# Patient Record
Sex: MALE | Race: WHITE | URBAN - METROPOLITAN AREA
[De-identification: names, ages, dates, MRNs, and addresses within clinical notes are randomized per-mention and may not be internally consistent; named-entity substitution may affect disease eponyms.]

---

## 2017-01-12 ENCOUNTER — OFFICE VISIT (OUTPATIENT)
Dept: FAMILY MEDICINE | Facility: CLINIC | Age: 47
End: 2017-01-12

## 2017-01-12 ENCOUNTER — OFFICE VISIT (OUTPATIENT)
Dept: PHARMACY | Facility: PHYSICIAN GROUP | Age: 47
End: 2017-01-12

## 2017-01-12 VITALS
BODY MASS INDEX: 35.22 KG/M2 | RESPIRATION RATE: 16 BRPM | HEART RATE: 84 BPM | HEIGHT: 70 IN | DIASTOLIC BLOOD PRESSURE: 72 MMHG | WEIGHT: 246 LBS | SYSTOLIC BLOOD PRESSURE: 112 MMHG | OXYGEN SATURATION: 98 % | TEMPERATURE: 98.2 F

## 2017-01-12 DIAGNOSIS — E66.09 OBESITY DUE TO EXCESS CALORIES, UNSPECIFIED OBESITY SEVERITY: ICD-10-CM

## 2017-01-12 DIAGNOSIS — J30.1 ALLERGIC RHINITIS DUE TO POLLEN, UNSPECIFIED RHINITIS SEASONALITY: ICD-10-CM

## 2017-01-12 DIAGNOSIS — J30.1 ALLERGIC RHINITIS DUE TO POLLEN: ICD-10-CM

## 2017-01-12 DIAGNOSIS — E78.5 HYPERLIPIDEMIA LDL GOAL <100: ICD-10-CM

## 2017-01-12 DIAGNOSIS — K21.9 GASTROESOPHAGEAL REFLUX DISEASE WITHOUT ESOPHAGITIS: ICD-10-CM

## 2017-01-12 DIAGNOSIS — M67.949 DISORDER OF TENDON IN FINGER: ICD-10-CM

## 2017-01-12 DIAGNOSIS — I10 BENIGN ESSENTIAL HYPERTENSION: ICD-10-CM

## 2017-01-12 DIAGNOSIS — J06.9 VIRAL UPPER RESPIRATORY TRACT INFECTION: ICD-10-CM

## 2017-01-12 LAB — HBA1C MFR BLD: 7.5 % (ref 4–7)

## 2017-01-12 PROCEDURE — 83036 HEMOGLOBIN GLYCOSYLATED A1C: CPT | Performed by: FAMILY MEDICINE

## 2017-01-12 PROCEDURE — 84443 ASSAY THYROID STIM HORMONE: CPT | Performed by: FAMILY MEDICINE

## 2017-01-12 PROCEDURE — 99605 MTMS BY PHARM NP 15 MIN: CPT | Performed by: PHARMACIST

## 2017-01-12 PROCEDURE — 99214 OFFICE O/P EST MOD 30 MIN: CPT | Performed by: FAMILY MEDICINE

## 2017-01-12 PROCEDURE — 99607 MTMS BY PHARM ADDL 15 MIN: CPT | Performed by: PHARMACIST

## 2017-01-12 PROCEDURE — 36415 COLL VENOUS BLD VENIPUNCTURE: CPT | Performed by: FAMILY MEDICINE

## 2017-01-12 RX ORDER — GUAIFENESIN 600 MG/1
1200 TABLET, EXTENDED RELEASE ORAL 2 TIMES DAILY PRN
Qty: 40 TABLET | Refills: 0 | Status: SHIPPED | OUTPATIENT
Start: 2017-01-12 | End: 2017-01-12

## 2017-01-12 RX ORDER — GUAIFENESIN 600 MG/1
1200 TABLET, EXTENDED RELEASE ORAL 2 TIMES DAILY PRN
Qty: 40 TABLET | Refills: 0 | Status: SHIPPED | OUTPATIENT
Start: 2017-01-12 | End: 2017-04-13

## 2017-01-12 NOTE — PROGRESS NOTES
SUBJECTIVE:  Reg Kathleen is an 46 year old male who presents for evaluation and treatment   of Type 2 diabetes mellitus with kidney issues/seeing Ree today. Age at diagnosis 33. Family history   positive for diabetes in the patient s mother, father, brother and sister.   Previous treatment modalities employed include diet, oral agents, exercise, intensive insulin injection program and ASA.   Current treatment includes oral agents, exercise, insulin injections and ASA.     Current monitoring regimen: home blood tests - once daily  Home blood sugar records: on a sliding scale for dinner dose  Last HgbA1c: 7.5/ not improved  Diabetic complications: nephropathy  Cardiovascular risk factors: previous smoker, family history, lipids, diabetes mellitus, hypertension, obesity and stress    Current Outpatient Prescriptions   Medication     canagliflozin (INVOKANA) 300 MG tablet     insulin aspart (NOVOLOG FLEXPEN) 100 UNIT/ML soln     insulin glargine (LANTUS SOLOSTAR) 100 UNIT/ML PEN     omeprazole (PRILOSEC) 20 MG capsule     liraglutide (VICTOZA PEN) 18 MG/3ML soln     metFORMIN (GLUCOPHAGE) 1000 MG tablet     losartan (COZAAR) 50 MG tablet     azelastine (ASTELIN) 0.1 % nasal spray     ibuprofen (ADVIL,MOTRIN) 600 MG tablet     blood glucose monitoring (ONE TOUCH TEST STRIPS) test strip     insulin pen needle (B-D U/F) 31G X 8 MM     ASPIRIN NOT PRESCRIBED, INTENTIONAL,     atorvastatin (LIPITOR) 10 MG tablet     No current facility-administered medications for this visit.     Allergies   Allergen Reactions     Amoxicillin Hives     Penicillins Hives     Septra [Sulfamethoxazole W-Trimethoprim] Hives       Social History   Substance Use Topics     Smoking status: Former Smoker -- 0.10 packs/day for 10 years     Types: Cigarettes     Quit date: 01/01/1998     Smokeless tobacco: Never Used      Comment: 3 cigarettes per day, smoked socially     Alcohol Use: 3.6 oz/week     6 Standard drinks or equivalent per  "week      Comment: 4 days per week, 3 drinks, more on weekends       Review Of Systems  Skin: rash  Eyes: saw eye MD, 2015/ needs visit  Ears/Nose/Throat: cold symptoms for a month with some sinus pressure/ worse with recent flying. Wonders about a sinus infection  Respiratory: No shortness of breath, dyspnea on exertion, cough, or hemoptysis  Cardiovascular: BP and cholesterol  Gastrointestinal: heartburn  Genitourinary: negative  Musculoskeletal: playing volleyball in [place] in November. Jammed his left fourth finger. Negative xray's. John taped but now stuck in flexion positions  Neurologic: negative  Psychiatric: excessive stress-traveling to [place] with work  Hematologic/Lymphatic/Immunologic: negative  Endocrine: diabetes    OBJECTIVE:  /72 mmHg  Pulse 84  Temp(Src) 98.2  F (36.8  C) (Oral)  Resp 16  Ht 1.772 m (5' 9.75\")  Wt 111.585 kg (246 lb)  BMI 35.54 kg/m2  SpO2 98%  General appearance: healthy, alert, no distress, cooperative, smiling and over weight  Skin: Skin color, texture, turgor normal. No rashes or lesions.  Eyes: conjunctivae/corneas clear. PERRL, EOM's intact. Fundi benign  Ears: negative  Nose: Clear rhinitis  Oropharynx: Lips, mucosa, and tongue normal. Teeth and gums normal. Post nasal drianage clear  Neck: Neck supple. No adenopathy. Thyroid symmetric, normal size,, Carotids without bruits.  Lungs: negative, Percussion normal. Good diaphragmatic excursion. Lungs clear  Heart: negative, PMI normal. No lifts, heaves, or thrills. RRR. No murmurs, clicks gallops or rub  Abdomen: Abdomen soft, non-tender. BS normal. No masses, organomegaly  Extremities: negative findings: no erythema, induration, or nodules, strength normal, positive findings: left fourth finger contraction in flexion/ Boutinerer deformity  Monofilament WNL  Peripheral pulses: radial=4/4, femoral=4/4, popliteal=4/4, dorsalis pedis=4/4,  Neuro: Gait normal. Reflexes normal and symmetric. Sensation grossly WNL.  BMI : " Body mass index is 35.54 kg/(m^2).    ASSESSMENT:  (E08.21,  E08.65,  Z79.4) Diabetes mellitus due to underlying condition, uncontrolled, with diabetic nephropathy, with long-term current use of insulin (H)  (primary encounter diagnosis)  Comment: poor compliance with diet and exercise  Plan: Hemoglobin A1c (BFP), TSH ([facility]), Foot Exam -         HIM Scan        Back to [facility]  Reviewed concepts of diabetes self-management stressing the primary   role of the patient in monitoring and maintaining control of   diabetes.    (J06.9,  B97.89) Viral upper respiratory tract infection  Plan: guaiFENesin (MUCINEX) 600 MG 12 hr tablet,         DISCONTINUED: guaiFENesin (MUCINEX) 600 MG 12         hr tablet        Symptomatic care with decongestants, fluids, tylenol/advil prn. Use GUAIFENESIN  MG OR TBCR, 1 tab po BID (Twice per day), D: 20, R: 0 for congestion and cough.    In addition, I have suggested that the patient   monitor for symptoms of bacterial infection expecting slow gradual resolution of viral URI as the natural course.      (M64.014) Disorder of tendon in finger  Comment: reviewed anatomy  Plan: ORTHOPEDICS ADULT REFERRAL        Consult with Dr Lee    (E66.09) Obesity due to excess calories, unspecified obesity severity  Plan: A low fat diet, regular aerobic exercise like walking 30 minutes daily and weight loss is the treatment recommendations at this time

## 2017-01-12 NOTE — PATIENT INSTRUCTIONS
Recommendations from today's MTM visit:                                                      1. Novolog 10 units with dinner    2. Keep checking sugars at 9 (or 2 hours after dinner)    Next MTM visit: I will call you next Friday at 11:30am    To schedule another MTM appointment, please call the clinic directly or you may call the MTM scheduling line at [phone] or toll-free at [phone].     My Clinical Pharmacist's contact information:                                                      It was a pleasure seeing you today!  Please feel free to contact me with any questions or concerns you have.      Ree Newell, Pharm.D, [facility]CP  Medication Therapy Management Pharmacist  [phone]    You may receive a survey about the MTM services you received.  I would appreciate your feedback to help me serve you better in the future. Please fill it out and return it when you can. Your comments will be anonymous.      My healthcare goals:                                                      Diabetes Goals:    Home Monitoring of Blood Sugars:Fasting  mg/dL and 2 hours after a meal less than 150 mg/dL.    Hemoglobin A1C: Less than 7%. Yours is A1C      7.5   1/12/2017.    Blood Pressure: Less than 140/90mmHg. Yours is There were no vitals taken for this visit..    Cholesterol: You are taking atorvastatin to help decrease the risk of heart disease.    Things you can do to help lower your blood sugars:    Diet: 3 meals and 1-2 snacks per day with 45-60 grams of carbohydrates per meal and 15-30 grams of carbohydrates per snack. Try to fill your plate at least half-full with vegetables, fill one-quarter full with lean meats or protein, and also make sure you get at least some carbohydrate with every meal.    Exercise: 30 minutes per day of anything that will increase your heart rate and make you break a sweat! Gardening, walking, cleaning the house, changing the oil in your car, etc. If you feel like 30 minutes  per day is too much, start small. Even lifting canned foods or working your arms with a resistance band in front of the TV can help.

## 2017-01-12 NOTE — NURSING NOTE
Patient is here for a recheck of their medication.  Also has a n injury to his left hand 4th finger - hurt it in Nov  Pre-Visit Screening :  Immunizations : Not applicable    Colonoscopy :na  Mammogram : na  Asthma Action Test/Plan : na  PHQ9/GAD7 :  na    BP done on the left arm, with a lg sized cuff.  Pulse - regular  My Chart - accepts    CLASSIFICATION OF OVERWEIGHT AND OBESITY BY BMI                         Obesity Class           BMI(kg/m2)  Underweight                                    < 18.5  Normal                                         18.5-24.9  Overweight                                     25.0-29.9  OBESITY                     I                  30.0-34.9                              II                 35.0-39.9  EXTREME OBESITY             III                >40                             Patient's  BMI Body mass index is 35.54 kg/(m^2).  http://hin.nhlbi.nih.gov/menuplanner/menu.cgi  Questioned patient about current smoking habits.  Pt. has never smoked.

## 2017-01-12 NOTE — MR AVS SNAPSHOT
After Visit Summary   1/12/2017    Reg Kathleen    MRN: [id number]           Patient Information     Date Of Birth          1970        Visit Information        Provider Department      1/12/2017 12:00 PM Lashae Tan MD [facility]        Today's Diagnoses     Diabetes mellitus due to underlying condition, uncontrolled, with diabetic nephropathy, with long-term current use of insulin (H)    -  1     Viral upper respiratory tract infection         Disorder of tendon in finger         Obesity due to excess calories, unspecified obesity severity           Care Instructions      [email or web address]     A low fat diet, regular aerobic exercise like walking 30 minutes daily and weight loss  is the treatment recommendations at this time    Back to ree    Schedule a consult with DR Lee.            Follow-ups after your visit        Additional Services     ORTHOPEDICS ADULT REFERRAL       Your provider has referred you to: [facility] [place] [phone]   [email or web address]    Please be aware that coverage of these services is subject to the terms and limitations of your health insurance plan.  Call member services at your health plan with any benefit or coverage questions.      Please bring the following to your appointment:    >>   Any x-rays, CTs or MRIs which have been performed.  Contact the facility where they were done to arrange for  prior to your scheduled appointment.    >>   List of current medications   >>   This referral request   >>   Any documents/labs given to you for this referral                  Your next 10 appointments already scheduled     Jan 12, 2017  1:00 PM   SHORT with Ree Newell RPH   [facility] M[facility] ([facility])    [address]  [address]  [facility] [id number] [phone]            Jan  "19, 2017  8:30 AM   HST  with BED 7 SH SLEEP   [facility] ([facility])    [address]  [address]  [place] [id number] [phone]            Jan 20, 2017  8:30 AM   HST Drop Off with  SLEEP CENTER DME   [facility]    [address]  [place] [id number] [phone]              Who to contact     If you have questions or need follow up information about today's clinic visit or your schedule please contact [facility] directly at [phone].  Normal or non-critical lab and imaging results will be communicated to you by Stepcasehart, letter or phone within 4 business days after the clinic has received the results. If you do not hear from us within 7 days, please contact the clinic through [employer] or phone. If you have a critical or abnormal lab result, we will notify you by phone as soon as possible.  Submit refill requests through Tarisa or call your pharmacy and they will forward the refill request to us. Please allow 3 business days for your refill to be completed.          Additional Information About Your Visit        Tarisa Information     Tarisa gives you secure access to your electronic health record. If you see a primary care provider, you can also send messages to your care team and make appointments. If you have questions, please call your primary care clinic.  If you do not have a primary care provider, please call [phone] and they will assist you.        Care EveryWhere ID     This is your Care EveryWhere ID. This could be used by other organizations to access your [facility] medical records  [id number]        Your Vitals Were     Pulse Temperature Respirations Height BMI (Body Mass Index) Pulse Oximetry    84 98.2  F (36.8  C) (Oral) 16 1.772 m (5' 9.75\") 35.54 kg/m2 98%       Blood Pressure from Last 3 Encounters:   01/12/17 112/72 "   10/14/16 108/68   10/07/16 130/84    Weight from Last 3 Encounters:   01/12/17 111.585 kg (246 lb)   10/14/16 112.583 kg (248 lb 3.2 oz)   10/07/16 112.764 kg (248 lb 9.6 oz)              We Performed the Following     Hemoglobin A1c (BFP)     ORTHOPEDICS ADULT REFERRAL     TSH ([facility])          Today's Medication Changes          These changes are accurate as of: 1/12/17 12:51 PM.  If you have any questions, ask your nurse or doctor.               Start taking these medicines.        Dose/Directions    guaiFENesin 600 MG 12 hr tablet   Commonly known as:  MUCINEX   Used for:  Viral upper respiratory tract infection   Started by:  Lashae Tan MD        Dose:  1200 mg   Take 2 tablets (1,200 mg) by mouth 2 times daily as needed for congestion   Quantity:  40 tablet   Refills:  0         These medicines have changed or have updated prescriptions.        Dose/Directions    losartan 50 MG tablet   Commonly known as:  COZAAR   This may have changed:  additional instructions   Used for:  Uncontrolled insulin dependent diabetes mellitus (H)        Dose:  50 mg   Take 1 tablet (50 mg) by mouth daily   Quantity:  90 tablet   Refills:  3            Where to get your medicines      These medications were sent to [id number] - [place], [place] - [address]  [address], [facility] [address]     Phone:  [phone]    - guaiFENesin 600 MG 12 hr tablet             Primary Care Provider Office Phone # Fax #    Kaci Wilde [phone]       [facility] [address]  [facility] [address]        Thank you!     Thank you for choosing [facility]  for your care. Our goal is always to provide you with excellent care. Hearing back from our patients is one way we can continue to improve our services. Please take a few minutes to complete the written survey that you may receive in the mail after your visit with us. Thank you!              Your Updated Medication List - Protect others around you: Learn how to safely use, store and throw away your medicines at www.disposemymeds.org.          This list is accurate as of: 1/12/17 12:51 PM.  Always use your most recent med list.                   Brand Name Dispense Instructions for use    ASPIRIN NOT PRESCRIBED    INTENTIONAL     continuous prn for other Antiplatelet medication not prescribed intentionally due to Not indicated based on age       atorvastatin 10 MG tablet    LIPITOR     Take 1/2 tablet daily for 1 week then increase to 1 daily.       azelastine 0.1 % spray    ASTELIN    1 Bottle    Spray 1 spray into both nostrils 2 times daily       blood glucose monitoring test strip    ONE TOUCH TEST STRIPS    400 each    Use to test blood sugar 4 times daily or as directed. Has Verio IQ Meter.       canagliflozin 300 MG tablet    INVOKANA    90 tablet    Take 1 tablet (300 mg) by mouth every morning (before breakfast)       guaiFENesin 600 MG 12 hr tablet    MUCINEX    40 tablet    Take 2 tablets (1,200 mg) by mouth 2 times daily as needed for congestion       ibuprofen 600 MG tablet    ADVIL/MOTRIN    60 tablet    Take 1 tablet (600 mg) by mouth 2 times daily (with meals)       insulin pen needle 31G X 8 MM    B-D U/F    300 each    Use 2-3 daily as directed with Novolog and Lantus. Please delete other syringe RX- needs pen needles.       LANTUS SOLOSTAR 100 UNIT/ML injection   Generic drug:  insulin glargine     18 mL    Inject 39 Units Subcutaneous twice daily       liraglutide 18 MG/3ML soln    VICTOZA PEN    3 Month    Inejct 1.8mg under the skin daily.       losartan 50 MG tablet    COZAAR    90 tablet    Take 1 tablet (50 mg) by mouth daily       metFORMIN 1000 MG tablet    GLUCOPHAGE    180 tablet    Take 1 tablet (1,000 mg) by mouth 2 times daily (with meals)       NovoLOG FLEXPEN 100 UNIT/ML injection   Generic drug:  insulin aspart     15 mL    Inject 20 units under the skin once with  your dinner meal.       omeprazole 20 MG CR capsule    priLOSEC    90 capsule    Take 1 capsule (20 mg) by mouth daily

## 2017-01-12 NOTE — PROGRESS NOTES
SUBJECTIVE/OBJECTIVE:                                                    Reg Kathleen is a 46 year old male coming in for a follow-up visit for Medication Therapy Management.  He was referred to me from Dr. Wilde.     Chief Complaint: Follow up from our visit on 11/4.  First visit of the year. Saw Dr. Tan today as PCP is out.     Tobacco: No tobacco use   Alcohol: 10 or more beverages /week    Medication Adherence: pt self adjusts and is not checking sugars as requested.    Diabetes/HTN/HLD:  Pt currently taking Metformin 1000mg BID, Lantus 35 BID (he self adjusts despite being told to keep this consistent), Victoza 1.8mg daily and Invokana 300mg once daily and using Novolog 20 units around 9pm if his sugars are >150, otherwise will skip novolog.  Pt is not experiencing side effects. Feels he eats more when his doses are higher on the Lantus so he finds it is not helpful to increase (so he is staying at 35 units BID). Eating 3 meals a day, but his breakfast and lunch are pretty small. Does snack after dinner and usually drinking several alcoholic drinks most nights of the week.  SMBG: one time daily to four times daily. Ranges (patient reported): 120-170 fasting, then around 9pm he is 130-280s.   Having hypoglycemia? No, but does get feelings of lows midday if he is exercising a lot (never has been <80mg/dl though)  Symptoms of hyperglycemia? none  ACEi/ARB: Yes- losartan prescribed, but patient is not regularly taking it because he thinks he gets dizzy or a cough or something  - Microalbumin is < 30 mg/g.   Aspirin: Not taking due to age  Statin: Yes- myalgias since on medication. Rarely takes it due to side effect concerns, has agreed to take occassionally  Tobacco Use-  reports that he quit smoking about 18 years ago. His smoking use included Cigarettes. He has a 30 pack-year smoking history. He has never used smokeless tobacco.    GERD: Current medications include: Prilosec (omeprazole)20mg   once  daily. Pt c/o no current symptoms.  Patient feels that current regimen is effective.    Allergic rhinitis: Current medications include Astelin nasal spray.  Pt feels that current therapy is effective.     Current labs include:  BP Readings from Last 3 Encounters:   01/12/17 112/72   10/14/16 108/68   10/07/16 130/84     A1C      7.2   10/7/2016.  CHOL      222   10/7/2016  TRIG      220   10/7/2016  HDL       48   10/7/2016  LDL      130   10/7/2016    Liver Function Studies -   Recent Labs   Lab Test  10/07/16   0901   PROTTOTAL  7.3   ALBUMIN  4.3   BILITOTAL  0.4   ALKPHOS  59   AST  17   ALT  20   BILIDIRECT  0.1       Lab Results   Component Value Date    UMALCR <30 02/12/2016       Last Basic Metabolic Panel:  NA      138   10/7/2016   POTASSIUM      4.3   10/7/2016  CHLORIDE      103   10/7/2016  BUN       16   10/7/2016  BUN   NOT APPLICABLE   10/7/2016  CR     1.09   10/7/2016  GFR ESTIMATE   Date Value Ref Range Status   10/07/2016 82 > OR = 60 mL/min/1.73m2 Final   06/03/2016 80 > OR = 60 mL/min/1.73m2 Final   01/14/2016 72 > OR = 60 mL/min/1.73m2 Final     No results found for: GFRESTBLACK  TSH   Date Value Ref Range Status   02/02/2012 2.827 0.30 - 5.00 mcU/mL Final   ]  There were no vitals taken for this visit.    Most Recent Immunizations   Administered Date(s) Administered     Hepatitis A Vac Ped/Adol-2 Dose 09/06/2011     Hepatitis B 03/08/2012     IPV 09/06/2011     Influenza (IIV3) 09/06/2011     Influenza Vaccine IM 3yrs+ 4 Valent IIV4 10/07/2016     Influenza Vaccine IM Ages 6-35 Months 4 Valent (PF) 10/31/2015     MMR 09/06/2011     Pneumococcal 23 valent 05/08/2015     Tdap (Adacel,Boostrix) 03/09/2010     Typhoid IM 01/28/2011     ASSESSMENT:                                                    Current medications were reviewed today.      Medication Adherence: needs improvement - see below    Diabetes/HTN/HLD: Needs Improvement. Patient is not meeting A1c goal of < 7%. Self monitoring of  blood glucose is not at goal of fasting  mg/dL and post prandial < 150 mg/dL. Pt would benefit from Bolus / Rapid Acting Insulin (Novolog) : decrease dose to 10 units but take it every day at dinner to control the sugars.     GERD: Stable.      Allergic rhinitis: Stable.       PLAN:                                                      1. Novolog 10 units with dinner- take at the start of the meal.    2. Keep checking sugars at 9 (or 2 hours after dinner)    - Will need to switch victoza to trulicity in the next fill due to insurance.       I spent 30 minutes with this patient today.  All changes were made via collaborative practice agreement with Kaci Wilde. A copy of the visit note was provided to the patient's primary care provider.     Will follow up in 1 week.    The patient was given a summary of these recommendations as an after visit summary.    Ree Newell, Pharm.D, BCACP  Medication Therapy Management Pharmacist  [phone]

## 2017-01-12 NOTE — MR AVS SNAPSHOT
After Visit Summary   1/12/2017    Reg Kathleen    MRN: [id number]           Patient Information     Date Of Birth          1970        Visit Information        Provider Department      1/12/2017 1:00 PM Ree Newell, [facility]        Today's Diagnoses     Uncontrolled diabetes with kidney complications (H)    -  1     Benign essential hypertension         Hyperlipidemia LDL goal <100         Gastroesophageal reflux disease without esophagitis         Allergic rhinitis due to pollen           Care Instructions    Recommendations from today's MTM visit:                                                      1. Novolog 10 units with dinner    2. Keep checking sugars at 9 (or 2 hours after dinner)    Next MTM visit: I will call you next Friday at 11:30am    To schedule another MTM appointment, please call the clinic directly or you may call the MTM scheduling line at [phone] or toll-free at [phone].     My Clinical Pharmacist's contact information:                                                      It was a pleasure seeing you today!  Please feel free to contact me with any questions or concerns you have.      Ree Newell, Pharm.D, [facility]  Medication Therapy Management Pharmacist  [phone]    You may receive a survey about the MTM services you received.  I would appreciate your feedback to help me serve you better in the future. Please fill it out and return it when you can. Your comments will be anonymous.      My healthcare goals:                                                      Diabetes Goals:    Home Monitoring of Blood Sugars:Fasting  mg/dL and 2 hours after a meal less than 150 mg/dL.    Hemoglobin A1C: Less than 7%. Yours is A1C      7.5   1/12/2017.    Blood Pressure: Less than 140/90mmHg. Yours is There were no vitals taken for this visit..    Cholesterol: You are taking atorvastatin to help decrease the risk of heart  disease.    Things you can do to help lower your blood sugars:    Diet: 3 meals and 1-2 snacks per day with 45-60 grams of carbohydrates per meal and 15-30 grams of carbohydrates per snack. Try to fill your plate at least half-full with vegetables, fill one-quarter full with lean meats or protein, and also make sure you get at least some carbohydrate with every meal.    Exercise: 30 minutes per day of anything that will increase your heart rate and make you break a sweat! Gardening, walking, cleaning the house, changing the oil in your car, etc. If you feel like 30 minutes per day is too much, start small. Even lifting canned foods or working your arms with a resistance band in front of the TV can help.                  Follow-ups after your visit        Your next 10 appointments already scheduled     Jan 19, 2017  8:30 AM   HST  with BED 7  SLEEP   [facility] ([facility])    [address]  [facility] [address]   [phone]            Jan 20, 2017  8:30 AM   HST Drop Off with  SLEEP CENTER [facility] ([facility])    [address]  [facility] [address]   [phone]            Jan 20, 2017 11:30 AM   TELEMEDICINE with Ree Newell [facility] ([facility])    [address] [facility]  [address]  [facility] [address]   [phone]           Note: this is not an onsite visit; there is no need to come to the facility.              Who to contact     If you have questions or need follow up information about today's clinic visit or your schedule please contact [facility] directly at [phone].  Normal or non-critical lab and imaging results will be communicated to you by MyChart, letter or phone within 4 business days after the clinic has received the results. If you do not hear from us within 7 days, please  contact the clinic through [employer] or phone. If you have a critical or abnormal lab result, we will notify you by phone as soon as possible.  Submit refill requests through [employer] or call your pharmacy and they will forward the refill request to us. Please allow 3 business days for your refill to be completed.          Additional Information About Your Visit        e-volohart Information     [employer] gives you secure access to your electronic health record. If you see a primary care provider, you can also send messages to your care team and make appointments. If you have questions, please call your primary care clinic.  If you do not have a primary care provider, please call [phone] and they will assist you.        Care EveryWhere ID     This is your Care EveryWhere ID. This could be used by other organizations to access your [place] medical records  [id number]         Blood Pressure from Last 3 Encounters:   01/12/17 112/72   10/14/16 108/68   10/07/16 130/84    Weight from Last 3 Encounters:   01/12/17 246 lb (111.585 kg)   10/14/16 248 lb 3.2 oz (112.583 kg)   10/07/16 248 lb 9.6 oz (112.764 kg)              Today, you had the following     No orders found for display         Today's Medication Changes          These changes are accurate as of: 1/12/17  1:06 PM.  If you have any questions, ask your nurse or doctor.               Start taking these medicines.        Dose/Directions    guaiFENesin 600 MG 12 hr tablet   Commonly known as:  MUCINEX   Used for:  Viral upper respiratory tract infection   Started by:  Lashae Tan MD        Dose:  1200 mg   Take 2 tablets (1,200 mg) by mouth 2 times daily as needed for congestion   Quantity:  40 tablet   Refills:  0         These medicines have changed or have updated prescriptions.        Dose/Directions    losartan 50 MG tablet   Commonly known as:  COZAAR   This may have changed:  additional instructions   Used for:  Uncontrolled insulin dependent diabetes  mellitus (H)        Dose:  50 mg   Take 1 tablet (50 mg) by mouth daily   Quantity:  90 tablet   Refills:  3            Where to get your medicines      These medications were sent to [id number] - [place], [place] - [address]  [address] [address]     Phone:  [phone]    - guaiFENesin 600 MG 12 hr tablet             Primary Care Provider Office Phone # Fax #    Kaci Wilde [phone]       [facility] [address]  [facility] [address]        Thank you!     Thank you for choosing [facility]  for your care. Our goal is always to provide you with excellent care. Hearing back from our patients is one way we can continue to improve our services. Please take a few minutes to complete the written survey that you may receive in the mail after your visit with us. Thank you!             Your Updated Medication List - Protect others around you: Learn how to safely use, store and throw away your medicines at www.disposemymeds.org.          This list is accurate as of: 1/12/17  1:06 PM.  Always use your most recent med list.                   Brand Name Dispense Instructions for use    ASPIRIN NOT PRESCRIBED    INTENTIONAL     continuous prn for other Antiplatelet medication not prescribed intentionally due to Not indicated based on age       atorvastatin 10 MG tablet    LIPITOR     Take 1/2 tablet daily for 1 week then increase to 1 daily.       azelastine 0.1 % spray    ASTELIN    1 Bottle    Spray 1 spray into both nostrils 2 times daily       blood glucose monitoring test strip    ONE TOUCH TEST STRIPS    400 each    Use to test blood sugar 4 times daily or as directed. Has Verio IQ Meter.       canagliflozin 300 MG tablet    INVOKANA    90 tablet    Take 1 tablet (300 mg) by mouth every morning (before breakfast)       guaiFENesin 600 MG 12 hr tablet    MUCINEX    40 tablet    Take 2 tablets (1,200 mg) by  mouth 2 times daily as needed for congestion       ibuprofen 600 MG tablet    ADVIL/MOTRIN    60 tablet    Take 1 tablet (600 mg) by mouth 2 times daily (with meals)       insulin pen needle 31G X 8 MM    B-D U/F    300 each    Use 2-3 daily as directed with Novolog and Lantus. Please delete other syringe RX- needs pen needles.       LANTUS SOLOSTAR 100 UNIT/ML injection   Generic drug:  insulin glargine     18 mL    Inject 39 Units Subcutaneous twice daily       liraglutide 18 MG/3ML soln    VICTOZA PEN    3 Month    Inejct 1.8mg under the skin daily.       losartan 50 MG tablet    COZAAR    90 tablet    Take 1 tablet (50 mg) by mouth daily       metFORMIN 1000 MG tablet    GLUCOPHAGE    180 tablet    Take 1 tablet (1,000 mg) by mouth 2 times daily (with meals)       NovoLOG FLEXPEN 100 UNIT/ML injection   Generic drug:  insulin aspart     15 mL    Inject 20 units under the skin once with your dinner meal.       omeprazole 20 MG CR capsule    priLOSEC    90 capsule    Take 1 capsule (20 mg) by mouth daily

## 2017-01-12 NOTE — PATIENT INSTRUCTIONS
[email or web address]   Symptomatic care with decongestants, fluids, tylenol/advil prn. Use GUAIFENESIN  MG OR TBCR, 1 tab po BID (Twice per day), D: 20, R: 0 for congestion and cough.    In addition, I have suggested that the patient   monitor for symptoms of bacterial infection expecting slow gradual resolution of viral URI as the natural course.      A low fat diet, regular aerobic exercise like walking 30 minutes daily and weight loss  is the treatment recommendations at this time.  Reviewed concepts of diabetes self-management stressing the primary   role of the patient in monitoring and maintaining control of   diabetes.    Back to [facility]    Schedule a consult with DR Lee.

## 2017-01-13 LAB — TSH SERPL-ACNC: 3.42 MIU/L (ref 0.4–4.5)

## 2017-01-19 ENCOUNTER — OFFICE VISIT (OUTPATIENT)
Dept: SLEEP MEDICINE | Facility: CLINIC | Age: 47
End: 2017-01-19
Payer: COMMERCIAL

## 2017-01-19 DIAGNOSIS — R06.83 SNORING: ICD-10-CM

## 2017-01-19 DIAGNOSIS — G47.30 OBSERVED SLEEP APNEA: Primary | ICD-10-CM

## 2017-01-19 DIAGNOSIS — Z82.0 FAMILY HISTORY OF SLEEP APNEA: ICD-10-CM

## 2017-01-19 PROCEDURE — G0399 HOME SLEEP TEST/TYPE 3 PORTA: HCPCS

## 2017-01-20 ENCOUNTER — DOCUMENTATION ONLY (OUTPATIENT)
Dept: SLEEP MEDICINE | Facility: CLINIC | Age: 47
End: 2017-01-20

## 2017-01-20 ENCOUNTER — ALLIED HEALTH/NURSE VISIT (OUTPATIENT)
Dept: PHARMACY | Facility: PHYSICIAN GROUP | Age: 47
End: 2017-01-20

## 2017-01-20 PROCEDURE — 99606 MTMS BY PHARM EST 15 MIN: CPT | Performed by: PHARMACIST

## 2017-01-20 NOTE — PROGRESS NOTES
SUBJECTIVE/OBJECTIVE:                                                    Reg Kathleen is a 46 year old male called for a follow-up visit for Medication Therapy Management.  He was referred to me from Dr. Wilde.     Chief Complaint: Follow up from our visit on 1/12.   Tobacco: No tobacco use   Alcohol: 10 or more beverages /week    Medication Adherence: no issues reported    Diabetes:  Pt currently taking Metformin 1000mg BID, Lantus 35 BID (he self adjusts despite being told to keep this consistent), Victoza 1.8mg daily and Invokana 300mg once daily and using Novolog 10 units with dinner now (was dosing post meal sporadically).  Pt is not experiencing side effects. Feels he eats more when his doses are higher on the Lantus so he finds it is not helpful to increase (so he is staying at 35 units BID). Eating 3 meals a day, but his breakfast and lunch are pretty small. Does snack after dinner and usually drinking several alcoholic drinks most nights of the week. Insurance is no longer covering his Victoza, preferred GLP1 is Trulicity.   SMBG: one time daily to four times daily. Ranges (patient reported): 125 fasting one daily, mostly 150-160. 2 hours after eating has been a little better 180-230, with setting fixed dinner Novolog dose.   Having hypoglycemia? No, but does get feelings of lows midday if he is exercising a lot (never has been <80mg/dl though)   Symptoms of hyperglycemia? none  ACEi/ARB: Yes- losartan prescribed, but patient is not regularly taking it because he thinks he gets dizzy or a cough or something  - Microalbumin is < 30 mg/g.   Aspirin: Not taking due to age  Statin: Yes- myalgias since on medication. Rarely takes it due to side effect concerns, has agreed to take occassionally  Tobacco Use-  reports that he quit smoking about 18 years ago. His smoking use included Cigarettes. He has a 30 pack-year smoking history. He has never used smokeless tobacco.      Current labs include:  BP  Readings from Last 3 Encounters:   01/12/17 112/72   10/14/16 108/68   10/07/16 130/84     Today's Vitals: There were no vitals taken for this visit.  A1C      7.5   1/12/2017.  CHOL      222   10/7/2016  TRIG      220   10/7/2016  HDL       48   10/7/2016  LDL      130   10/7/2016    Liver Function Studies -   Recent Labs   Lab Test  10/07/16   0901   PROTTOTAL  7.3   ALBUMIN  4.3   BILITOTAL  0.4   ALKPHOS  59   AST  17   ALT  20   BILIDIRECT  0.1       Lab Results   Component Value Date    UMALCR <30 02/12/2016       Last Basic Metabolic Panel:  NA      138   10/7/2016   POTASSIUM      4.3   10/7/2016  CHLORIDE      103   10/7/2016  BUN       16   10/7/2016  BUN   NOT APPLICABLE   10/7/2016  CR     1.09   10/7/2016  GFR ESTIMATE   Date Value Ref Range Status   10/07/2016 82 > OR = 60 mL/min/1.73m2 Final   06/03/2016 80 > OR = 60 mL/min/1.73m2 Final   01/14/2016 72 > OR = 60 mL/min/1.73m2 Final     No results found for: GFRESTBLACK  TSH   Date Value Ref Range Status   01/12/2017 3.42 0.40 - 4.50 mIU/L Final   ]    Most Recent Immunizations   Administered Date(s) Administered     Hepatitis A Vac Ped/Adol-2 Dose 09/06/2011     Hepatitis B 03/08/2012     IPV 09/06/2011     Influenza (IIV3) 09/06/2011     Influenza Vaccine IM 3yrs+ 4 Valent IIV4 10/07/2016     Influenza Vaccine IM Ages 6-35 Months 4 Valent (PF) 10/31/2015     MMR 09/06/2011     Pneumococcal 23 valent 05/08/2015     Tdap (Adacel,Boostrix) 03/09/2010     Typhoid IM 01/28/2011     ASSESSMENT:                                                    Current medications were reviewed today.      Medication Adherence: no issues identified    Diabetes: Needs Improvement. Patient is not meeting A1c goal of < 7%. Self monitoring of blood glucose is not at goal of fasting  mg/dL and post prandial < 150 mg/dL. Pt would benefit from Bolus / Rapid Acting Insulin (Novolog) : increase dose to 12 units with dinner. Will also change his Victoza to Trulicity today due  to insurance coverage. Reviewed administration, disposal and storage with him today.     PLAN:                                                      1. Switch Victoza to Trulicity 1.5mg weekly.     2. Increase Novolog to 12 units with dinner.     I spent 15 minutes with this patient today.  All changes were made via collaborative practice agreement with Kaci Wilde. A copy of the visit note was provided to the patient's primary care provider.     Will follow up in 2 week.    The patient declined a summary of these recommendations as an after visit summary.    Ree Newell, Pharm.D, [facility]  Medication Therapy Management Pharmacist  [phone]

## 2017-01-24 PROBLEM — G47.33 OSA (OBSTRUCTIVE SLEEP APNEA): Status: ACTIVE | Noted: 2017-01-24

## 2017-01-24 NOTE — PROCEDURES
"HOME SLEEP STUDY INTERPRETATION    Patient: Reg Kathleen  MRN: [id number]  YOB: 1970  Study Date: 2017  Referring Provider: Kaci Wilde;   Ordering Provider: Bennett Goltz, PA     Indications for Home Study: Reg Kathleen is a 46 year old male with a history of obesity, GERD who presents with symptoms suggestive of obstructive sleep apnea.    Estimated body mass index is 35.54 kg/(m^2) as calculated from the following:    Height as of 17: 1.772 m (5' 9.75\").    Weight as of 17: 111.585 kg (246 lb).  Total score - Lamar: 6 (10/14/2016  8:00 AM)  STOP-BAN/8    Data: A full night home sleep study was performed recording the standard physiologic parameters including body position, movement, sound, nasal pressure, thermal oral airflow, chest and abdominal movements with respiratory inductance plethysmography, and oxygen saturation by pulse oximetry. Pulse rate was estimated by oximetry recording. This study was considered adequate based on > 4 hours of quality oximetry and respiratory recording.     Analysis Time:  435.5 minutes    Respiration:   Sleep Associated Hypoxemia: sustained hypoxemia was present. Baseline oxygen saturation was 91.1%.  Time with saturation less than or equal to 88% was 41.3 minutes. The lowest oxygen saturation was 77%.   Snoring: Snoring was present.  Respiratory events: The home study revealed a presence of 2 obstructive apneas and 0 mixed and 3 central apneas. There were 101 hypopneas resulting in a combined apnea/hypopnea index [AHI] of 14.6 events per hour.  AHI was 46.9 per hour supine, 7.3 per hour prone, 5.5 per hour on left side, and 4 per hour on right side.   Pattern: Excluding events noted above, respiratory rate and pattern was Normal.    Position: Percent of time spent: supine - 23.5%, prone - 9.4%, on left - 14.9%, on right - 52.1%.    Heart Rate: By pulse oximetry tachycardia was noted.     Assessment:   Mild obstructive " sleep apnea with supine predominance.  Sleep associated hypoxemia was present.    Recommendations:  Consider auto-CPAP at 5-15 cmH2O or oral appliance therapy.  Suggest optimizing sleep hygiene and avoiding sleep deprivation.  Weight management.    Diagnosis Code(s): Obstructive Sleep Apnea G47.33, Hypoxemia G47.36    Karlos March MD, MD, January 24, 2017   Diplomate, American Board of Psychiatry and Neurology, Sleep Medicine

## 2017-01-24 NOTE — TELEPHONE ENCOUNTER
Yes, it does show that #90 was filled, however, this was in error.  I only authorized #30 for that day (12/21/16) but I forgot to change the dispense amount to #30 and accidentally left it at #90.  He did get the #30 and then was seen by you on 1/12/17 so he should now get his Rx at #90 with 1 refill.  I apologize for this error.  I tried to go back to change it, but EPIC did not allow.  Please fill the #90 with 1 refill.    Pending Prescriptions:                       Disp   Refills    canagliflozin (INVOKANA) 300 MG tablet    90 tab*1            Sig: Take 1 tablet (300 mg) by mouth every morning           (before breakfast)  Refused Prescriptions:                       Disp   Refills    canagliflozin (INVOKANA) 300 MG tablet     30 tab*0        Sig: Take 1 tablet (300 mg) by mouth every morning (before           breakfast)  Refused By: DENNIS MCKINNEY  Reason for Refusal: Patient needs appointment

## 2017-01-24 NOTE — TELEPHONE ENCOUNTER
Last Refill: 12/21/16  Last Office Visit: 01/12/17  Scheduled Office Visit: 02/03/17 with Ree    Pt was seen for diabetic with Dr. aTn and did not get a refill of medication.    Do you want to refill this?    Pending Prescriptions:                       Disp   Refills    canagliflozin (INVOKANA) 300 MG tablet    90 tab*1            Sig: Take 1 tablet (300 mg) by mouth every morning           (before breakfast)    Please Close Encounter If Approved.  Pharmacy has been selected.     Thank You,  Emiliana.DAVON Guzmán ([facility])

## 2017-01-30 ENCOUNTER — TELEPHONE (OUTPATIENT)
Dept: SLEEP MEDICINE | Facility: CLINIC | Age: 47
End: 2017-01-30

## 2017-01-30 DIAGNOSIS — G47.33 OSA (OBSTRUCTIVE SLEEP APNEA): Primary | ICD-10-CM

## 2017-01-30 NOTE — TELEPHONE ENCOUNTER
Home Sleep Study Test Results  Reg Kathleen underwent a home sleep study done on 1/19/2017 for loud snoring and feeling unrefreshed from sleep. He wakes feeling out of breath and has a family history of sleep apnea. These symptoms have been occurring for almost 2 decades. His medical history is significant for DM type 2, GERD and TMJ disorder. He has 3 siblings with GILDA.    Bed Time Starts: 11:44 PM.  Bed Time Ends: 6:59 AM.  Total estimated sleep time 435.5 minutes.    AHI: 14.6/hr VALENTINA: 25.5/hr Supine AHI: 46.9/hr Lateral AHI: 5.5/hr on left, 4/hr on right and 7.3/hr prone   Average SpO2: 91% Lowest Desaturation: 77%  Time Spent Below 89%: 41.3 minutes  Total Obstructive Apneas 2  Total Central/Mixed Apneas 3  Hypopneas 101    Description of Snoring: moderate    Average Pulse: 70 bpm  Highest Pulse: 102 bpm  Lowest Pulse: 59 bpm    Percent of time spent supine: 23.5%. He spent 9.4% prone, 14.9% on left and 52.1% on his right.  Preliminary Interpretation of Results:  Mild to moderate positional GILDA    He feels he slept ok compared.     I reviewed his results. He was interested in trying CPAP. I am prescribing auto CPAP 5-15 cm. I reviewed the compliance goals and the mask exchange policy. He will follow up in 2 months.  Bennett Goltz, PA-C

## 2017-01-31 ENCOUNTER — TELEPHONE (OUTPATIENT)
Dept: SLEEP MEDICINE | Facility: CLINIC | Age: 47
End: 2017-01-31

## 2017-02-03 ENCOUNTER — TELEPHONE (OUTPATIENT)
Dept: PHARMACY | Facility: PHYSICIAN GROUP | Age: 47
End: 2017-02-03

## 2017-02-03 ENCOUNTER — ALLIED HEALTH/NURSE VISIT (OUTPATIENT)
Dept: PHARMACY | Facility: PHYSICIAN GROUP | Age: 47
End: 2017-02-03

## 2017-02-03 PROCEDURE — 99606 MTMS BY PHARM EST 15 MIN: CPT | Performed by: PHARMACIST

## 2017-02-03 NOTE — TELEPHONE ENCOUNTER
Left message for pt to call back for scheduled follow up MTM visit.     Ree Newell, Pharm.D, [facility]  Medication Therapy Management Pharmacist  [phone]

## 2017-02-03 NOTE — PROGRESS NOTES
SUBJECTIVE/OBJECTIVE:                                                    Reg Kathleen is a 46 year old male called for a follow-up visit for Medication Therapy Management.  He was referred to me from Dr. Wilde.     Chief Complaint: Follow up from our visit on 1/20.    Tobacco: No tobacco use   Alcohol: 10 or more beverages /week    Medication Adherence: no issues reported    Diabetes:  Pt currently taking Metformin 1000mg BID, Lantus 35 BID (he self adjusts despite being told to keep this consistent), Victoza 1.8mg daily and Invokana 300mg once daily and using Novolog 12-14  units with dinner now (previously dosing post meal sporadically).  Pt is not experiencing side effects. Feels he eats more when his doses are higher on the Lantus so he finds it is not helpful to increase (so he is staying at 35 units BID). Eating 3 meals a day, but his breakfast and lunch are pretty small. Does snack after dinner and usually drinking several alcoholic drinks most nights of the week. Insurance is no longer covering his Victoza, preferred GLP1 is Trulicity- he has filled this but still has two Victoza pens left before switching.    SMBG: one time daily to four times daily. Ranges (patient reported):  fasting, post dinner up to 180-240.  Having hypoglycemia? No, but does get feelings of lows midday if he is exercising a lot (never has been <80mg/dl though)   Symptoms of hyperglycemia? none  ACEi/ARB: Yes- losartan prescribed, but patient is not regularly taking it because he thinks he gets dizzy or a cough or other side effects  - Microalbumin is < 30 mg/g.   Aspirin: Not taking due to age  Statin: Yes- myalgias since on medication. Rarely takes it due to side effect concerns, has agreed to take occassionally  Tobacco Use-  reports that he quit smoking about 18 years ago. His smoking use included Cigarettes. He has a 30 pack-year smoking history. He has never used smokeless tobacco.    Current labs include:  BP  Readings from Last 3 Encounters:   01/12/17 112/72   10/14/16 108/68   10/07/16 130/84     Today's Vitals: There were no vitals taken for this visit.  A1C      7.5   1/12/2017.  CHOL      222   10/7/2016  TRIG      220   10/7/2016  HDL       48   10/7/2016  LDL      130   10/7/2016    Liver Function Studies -   Recent Labs   Lab Test  10/07/16   0901   PROTTOTAL  7.3   ALBUMIN  4.3   BILITOTAL  0.4   ALKPHOS  59   AST  17   ALT  20   BILIDIRECT  0.1       Lab Results   Component Value Date    UMALCR <30 02/12/2016       Last Basic Metabolic Panel:  NA      138   10/7/2016   POTASSIUM      4.3   10/7/2016  CHLORIDE      103   10/7/2016  BUN       16   10/7/2016  BUN   NOT APPLICABLE   10/7/2016  CR     1.09   10/7/2016  GFR ESTIMATE   Date Value Ref Range Status   10/07/2016 82 > OR = 60 mL/min/1.73m2 Final   06/03/2016 80 > OR = 60 mL/min/1.73m2 Final   01/14/2016 72 > OR = 60 mL/min/1.73m2 Final     No results found for: GFRESTBLACK  TSH   Date Value Ref Range Status   01/12/2017 3.42 0.40 - 4.50 mIU/L Final   ]    Most Recent Immunizations   Administered Date(s) Administered     Hepatitis A Vac Ped/Adol-2 Dose 09/06/2011     Hepatitis B 03/08/2012     IPV 09/06/2011     Influenza (IIV3) 09/06/2011     Influenza Vaccine IM 3yrs+ 4 Valent IIV4 10/07/2016     Influenza Vaccine IM Ages 6-35 Months 4 Valent (PF) 10/31/2015     MMR 09/06/2011     Pneumococcal 23 valent 05/08/2015     Tdap (Adacel,Boostrix) 03/09/2010     Typhoid IM 01/28/2011     ASSESSMENT:                                                    Current medications were reviewed today as discussed above.      Medication Adherence: no issues identified    Diabetes: Needs Improvement. Self monitoring of blood glucose is not at goal of post prandial < 150 mg/dL. Pt would benefit from Bolus / Rapid Acting Insulin (Novolog) : increase dose to 14-16 units with dinner. Encouraged ongoing exercise efforts.      PLAN:                                                       1. Increase dinner Novolog to 14-16 units with dinner.     I spent 10 mins with this patient today.  All changes were made via collaborative practice agreement with Kaci Wilde. A copy of the visit note was provided to the patient's primary care provider.     Will follow up in 2 week.    The patient declined a summary of these recommendations as an after visit summary.    Ree Newell, Pharm.D, [facility]  Medication Therapy Management Pharmacist  [phone]

## 2017-02-07 DIAGNOSIS — G47.33 OSA (OBSTRUCTIVE SLEEP APNEA): Primary | ICD-10-CM

## 2017-02-08 ENCOUNTER — TELEPHONE (OUTPATIENT)
Dept: SLEEP MEDICINE | Facility: CLINIC | Age: 47
End: 2017-02-08

## 2017-02-08 DIAGNOSIS — G47.33 OSA (OBSTRUCTIVE SLEEP APNEA): Primary | ICD-10-CM

## 2017-02-09 NOTE — TELEPHONE ENCOUNTER
I called Reg to discuss his situation regarding getting a CPAP. His insurance will not cover a CPAP unless his AHI is 15/hr or more. His HST showed an AHI of 14.6/hr. He did not have ancillary symptoms that would allow for coverage of a CPAP, such as sleepiness, insomnia, HTN, depression or heart disease. We talked about options of where to go from here. His HST did show hypoxemia, but I think that was a consequence of the HST device reading low (which we know is the case with these devices), not true hypoxemia. I think it would be most prudent to repeat a home study and have him sleep on his back more. His supine AHI was 46/hr on his initial test. He only spent about 25% of the night supine. We will have to see if a repeat HST will be covered. Otherwise, an in lab study should be justifiable for the purpose of disproving the hypoxemia finding. I will have our staff find out if his insurance will cover a repeat study and get back in touch with him to schedule.  Bennett Goltz, PA-C

## 2017-02-10 NOTE — TELEPHONE ENCOUNTER
I spoke with Reg because it is likely a repeat HST will not be covered. I think repeating an in lab study is excessive. Staff from [facility] are under the impression that CPAP will likely be covered, and he could return it if not covered.   I will place an order for auto CPAP 5-15 cm.   Bennett Goltz, PA-C

## 2017-02-14 ENCOUNTER — TELEPHONE (OUTPATIENT)
Dept: SLEEP MEDICINE | Facility: CLINIC | Age: 47
End: 2017-02-14

## 2017-02-17 ENCOUNTER — ALLIED HEALTH/NURSE VISIT (OUTPATIENT)
Dept: PHARMACY | Facility: PHYSICIAN GROUP | Age: 47
End: 2017-02-17

## 2017-02-17 PROCEDURE — 99606 MTMS BY PHARM EST 15 MIN: CPT | Performed by: PHARMACIST

## 2017-02-17 NOTE — PROGRESS NOTES
SUBJECTIVE/OBJECTIVE:                                                    Reg Kathleen is a 46 year old male called for a follow-up visit for Medication Therapy Management.  He was referred to me from Dr. Wilde.     Chief Complaint: Follow up from our visit on 2/3.  Spoke with patient, but he ended the call early due to schedule conflict- he was to call back, but did not call back.   Tobacco: No tobacco use   Alcohol: 10 or more beverages /week    Medication Adherence: firm medication beliefs regarding effects/side effects, self adjusts    Diabetes:  Pt currently taking Metformin 1000mg BID, Lantus 35 BID (he self adjusts despite being told to keep this consistent), Victoza 1.8mg daily and Invokana 300mg once daily and using Novolog 12-14  units with dinner now (previously dosing post meal sporadically).  Pt is not experiencing side effects. Feels he eats more when his doses are higher on the Lantus so he finds it is not helpful to increase (so he is staying at 35 units BID). Eating 3 meals a day, but his breakfast and lunch are pretty small. Does snack after dinner and usually drinking several alcoholic drinks most nights of the week- has not been clear with his timing of drinking in relationship to his blood sugar readings.   Insurance is no longer covering his Victoza, preferred GLP1 is Trulicity- he has filled this, has 1 day left of the Victoza.    SMBG: one time daily to four times daily. Ranges (patient reported):   Fasting: None reported  Post dinner (9pm) none reported  Having hypoglycemia? No, but does get feelings of lows midday if he is exercising a lot (never has been <80mg/dl though)   Symptoms of hyperglycemia? none  ACEi/ARB: Yes- losartan prescribed, but patient is not regularly taking it because he thinks he gets dizzy or a cough or other side effects - Microalbumin is < 30 mg/g.   Aspirin: Not taking due to age  Statin: Yes- myalgias since on medication. Rarely takes it due to side  effect concerns, has agreed to take occassionally  Tobacco Use-  reports that he quit smoking about 18 years ago. His smoking use included Cigarettes. He has a 30 pack-year smoking history. He has never used smokeless tobacco.    Current labs include:  BP Readings from Last 3 Encounters:   01/12/17 112/72   10/14/16 108/68   10/07/16 130/84     Today's Vitals: There were no vitals taken for this visit.  Lab Results   Component Value Date    A1C 7.5 01/12/2017   .  Lab Results   Component Value Date    CHOL 222 10/07/2016     Lab Results   Component Value Date    TRIG 220 10/07/2016     Lab Results   Component Value Date    HDL 48 10/07/2016     Lab Results   Component Value Date     10/07/2016       Liver Function Studies -   Recent Labs   Lab Test  10/07/16   0901   PROTTOTAL  7.3   ALBUMIN  4.3   BILITOTAL  0.4   ALKPHOS  59   AST  17   ALT  20   BILIDIRECT  0.1       Lab Results   Component Value Date    UMALCR <30 02/12/2016       Last Basic Metabolic Panel:  Lab Results   Component Value Date     10/07/2016      Lab Results   Component Value Date    POTASSIUM 4.3 10/07/2016     Lab Results   Component Value Date    CHLORIDE 103 10/07/2016     Lab Results   Component Value Date    BUN 16 10/07/2016    BUN NOT APPLICABLE 10/07/2016     Lab Results   Component Value Date    CR 1.09 10/07/2016     GFR Estimate   Date Value Ref Range Status   10/07/2016 82 > OR = 60 mL/min/1.73m2 Final   06/03/2016 80 > OR = 60 mL/min/1.73m2 Final   01/14/2016 72 > OR = 60 mL/min/1.73m2 Final     No results found for: GFRESTBLACK  TSH   Date Value Ref Range Status   01/12/2017 3.42 0.40 - 4.50 mIU/L Final   ]    Most Recent Immunizations   Administered Date(s) Administered     Hepatitis A Vac Ped/Adol-2 Dose 09/06/2011     Hepatitis B 03/08/2012     IPV 09/06/2011     Influenza (IIV3) 09/06/2011     Influenza Vaccine IM 3yrs+ 4 Valent IIV4 10/07/2016     Influenza Vaccine IM Ages 6-35 Months 4 Valent (PF) 10/31/2015      MMR 09/06/2011     Pneumococcal 23 valent 05/08/2015     Tdap (Adacel,Boostrix) 03/09/2010     Typhoid IM 01/28/2011     ASSESSMENT:                                                    Current medications were reviewed today as discussed above.      Medication Adherence: continue to encourage him to not self adjust    Diabetes: Needs Improvement. Patient is not meeting A1c goal of < 7%. Self monitoring of blood glucose is not at goal of fasting  mg/dL and post prandial < 150 mg/dL. Pt would benefit from rescheduling this visit to get blood sugar readings and finish the plan with him.    PLAN:                                                      1. Pt to call back to reschedule    I spent 5 mins with this patient today.  A copy of the visit note was provided to the patient's primary care provider.     Will follow up in 1 week.    The patient declined a summary of these recommendations as an after visit summary.    Ree Newell, Pharm.D, [facility]  Medication Therapy Management Pharmacist  [phone]

## 2017-02-23 ENCOUNTER — DOCUMENTATION ONLY (OUTPATIENT)
Dept: SLEEP MEDICINE | Facility: CLINIC | Age: 47
End: 2017-02-23

## 2017-02-23 NOTE — PROGRESS NOTES
[facility]  Patient Reg Kathleen was set up at [facility] on February 23, 2017. Patient received a [employer] DreamStation Auto. Pressures were set at 5-15 cm H2O.   Patient s ramp is 5 cm H2O for Off and FLEX/EPR is 2.  Patient received a Resmed Mask name: AIRFIT N20  Nasal mask Size Medium, heated tubing and heated humidifier.  Patient is enrolled in the STM Program and does not need to meet compliance. Patient has a follow up on 4/13/17 with Bennett Goltz, PA.    Pia Lara

## 2017-02-27 ENCOUNTER — DOCUMENTATION ONLY (OUTPATIENT)
Dept: SLEEP MEDICINE | Facility: CLINIC | Age: 47
End: 2017-02-27

## 2017-02-27 NOTE — PROGRESS NOTES
3 DAY STM VISIT    Patient contacted for 3 day STM visit  Message left for patient to return call    Current settings:  EPAP Min Auto CPAP: 5 (CPAP Min Auto CPAP)       EPAP Max Auto CPAP: 15 (CPAP Max Auto CPAP)       Assessment:  Nightly usage over four hours.  Action plan: Pt to have f/u 14 day  STM visit.

## 2017-03-10 ENCOUNTER — TELEPHONE (OUTPATIENT)
Dept: PHARMACY | Facility: PHYSICIAN GROUP | Age: 47
End: 2017-03-10

## 2017-03-10 ENCOUNTER — ALLIED HEALTH/NURSE VISIT (OUTPATIENT)
Dept: PHARMACY | Facility: PHYSICIAN GROUP | Age: 47
End: 2017-03-10

## 2017-03-10 ENCOUNTER — DOCUMENTATION ONLY (OUTPATIENT)
Dept: SLEEP MEDICINE | Facility: CLINIC | Age: 47
End: 2017-03-10

## 2017-03-10 PROCEDURE — 99606 MTMS BY PHARM EST 15 MIN: CPT | Performed by: PHARMACIST

## 2017-03-10 NOTE — PROGRESS NOTES
14 DAY STM VISIT    Subjective measures:  Pt states things are going well and has no issues or complaints.  Pt is benefiting from therapy.     Assessment: Pt meeting objective benchmarks.  Patient meeting subjective benchmarks.   Action plan: Pt to have 30 day STM visit.   Device settings:    EPAP Min Auto CPAP: 5 (CPAP Min Auto CPAP)    EPAP Max Auto CPAP: 15 (CPAP Max Auto CPAP)    Avg EPAP pressure (90th %ile) 14 day average (Shania): 7.2cm H20    Objective measures: 14 day rolling measures      % compliance greater than four hours rolling average 14 days: 92.8%     Average % of night in large leak Rolling Average 14 days (SHANIA): 0/100 last data upload     AHI Rolling Average 14 Day: 3.09 last data upload      Time mask on face 14 day average: 436 min          Objective measure goal  Compliance   Goal >70%  Leak   Goal < 10%  AHI  Goal < 5  Usage  Goal >240

## 2017-03-10 NOTE — PROGRESS NOTES
SUBJECTIVE/OBJECTIVE:                                                    Reg Kathleen is a 46 year old male called for a follow-up visit for Medication Therapy Management.  He was referred to me from Dr. Wilde.     Chief Complaint: Follow up from our visit on 2/17.   Tobacco: No tobacco use   Alcohol: 7-9 beverages / week    Medication Adherence: no issues reported    Diabetes:  Pt currently taking Metformin 1000mg BID, Lantus 35 BID (he self adjusts despite being told to keep this consistent), Trulicity 1.5mg weekly and Invokana 300mg once daily and using Novolog 15  units with dinner now (previously dosing post meal sporadically). Pt is not experiencing side effects.  120-130s fasting.   2 hours after supper- 130-220  He tried adding in Novolog with breakfast for a bit, but then had issues with going too low when exercising, so has stopped that now.   Still trying to keep on an exercise routine.   Feels he eats more when his doses are higher on the Lantus so he finds it is not helpful to increase (so he is staying at 35 units BID). Eating 3 meals a day, but his breakfast and lunch are pretty small. Does snack after dinner and usually drinking several alcoholic drinks most nights of the week- has not been clear with his timing of drinking in relationship to his blood sugar readings.   Having hypoglycemia? No, but does get feelings of lows midday if he is exercising a lot (never has been <80mg/dl though)   Symptoms of hyperglycemia? none  ACEi/ARB: Yes- losartan prescribed, but patient is not regularly taking it because he thinks he gets dizzy or a cough or other side effects - Microalbumin is < 30 mg/g.   Aspirin: Not taking due to age  Statin: Yes- myalgias since on medication. Rarely takes it due to side effect concerns, has agreed to take occassionally  Tobacco Use-  reports that he quit smoking about 18 years ago. His smoking use included Cigarettes. He has a 30 pack-year smoking history. He has  never used smokeless tobacco.    Current labs include:  BP Readings from Last 3 Encounters:   01/12/17 112/72   10/14/16 108/68   10/07/16 130/84     Today's Vitals: There were no vitals taken for this visit.  Lab Results   Component Value Date    A1C 7.5 01/12/2017   .  Lab Results   Component Value Date    CHOL 222 10/07/2016     Lab Results   Component Value Date    TRIG 220 10/07/2016     Lab Results   Component Value Date    HDL 48 10/07/2016     Lab Results   Component Value Date     10/07/2016       Liver Function Studies -   Recent Labs   Lab Test  10/07/16   0901   PROTTOTAL  7.3   ALBUMIN  4.3   BILITOTAL  0.4   ALKPHOS  59   AST  17   ALT  20   BILIDIRECT  0.1       Lab Results   Component Value Date    UMALCR <30 02/12/2016       Last Basic Metabolic Panel:  Lab Results   Component Value Date     10/07/2016      Lab Results   Component Value Date    POTASSIUM 4.3 10/07/2016     Lab Results   Component Value Date    CHLORIDE 103 10/07/2016     Lab Results   Component Value Date    BUN 16 10/07/2016    BUN NOT APPLICABLE 10/07/2016     Lab Results   Component Value Date    CR 1.09 10/07/2016     GFR Estimate   Date Value Ref Range Status   10/07/2016 82 > OR = 60 mL/min/1.73m2 Final   06/03/2016 80 > OR = 60 mL/min/1.73m2 Final   01/14/2016 72 > OR = 60 mL/min/1.73m2 Final     No results found for: GFRESTBLACK  TSH   Date Value Ref Range Status   01/12/2017 3.42 0.40 - 4.50 mIU/L Final   ]    Most Recent Immunizations   Administered Date(s) Administered     Hepatitis A Vac Ped/Adol-2 Dose 09/06/2011     Hepatitis B 03/08/2012     IPV 09/06/2011     Influenza (IIV3) 09/06/2011     Influenza Vaccine IM 3yrs+ 4 Valent IIV4 10/07/2016     Influenza Vaccine IM Ages 6-35 Months 4 Valent (PF) 10/31/2015     MMR 09/06/2011     Pneumococcal 23 valent 05/08/2015     Tdap (Adacel,Boostrix) 03/09/2010     Typhoid IM 01/28/2011     ASSESSMENT:                                                    Current  medications were reviewed today as discussed above.      Medication Adherence: no issues identified    Diabetes: Needs Improvement. Patient is not meeting A1c goal of < 7%. Pt would benefit from Bolus / Rapid Acting Insulin (Novolog) : increase dose to 16 units with dinner.     PLAN:                                                      1. Novolog increase to 16 units.     I spent 15 minutes with this patient today.  All changes were made via collaborative practice agreement with Kaci Wilde A copy of the visit note was provided to the patient's primary care provider.     Will follow up in 1 month with PCP and MTM.    The patient declined a summary of these recommendations as an after visit summary.    Ree Newell, Pharm.D, BCACP  Medication Therapy Management Pharmacist  [phone]

## 2017-03-10 NOTE — TELEPHONE ENCOUNTER
Left message for pt to call back and schedule follow up MTM visit.     Ree Newell, Pharm.D, [facility]  Medication Therapy Management Pharmacist  [phone]

## 2017-03-20 ENCOUNTER — MYC REFILL (OUTPATIENT)
Dept: FAMILY MEDICINE | Facility: CLINIC | Age: 47
End: 2017-03-20

## 2017-03-20 NOTE — TELEPHONE ENCOUNTER
Patient is requesting a refill of the following med:  Pending Prescriptions:                       Disp   Refills    insulin glargine (LANTUS SOLOSTAR) 100 UN*30 mL  0            Sig: Inject 35 Units Subcutaneous twice daily    Last Office Visit: 01/12/17 (w/ Dr. Tan) and 03/10/17 w/ Ree Newell  Scheduled Office Visit: None Scheduled    Ree could you please review and approve if appropriate?    Thank You,  Florence Galvan, CMA

## 2017-03-20 NOTE — TELEPHONE ENCOUNTER
could you please call patient to help schedule these appointments?  See Ree's note below for appointment details.    Thank You,  Florence Galvan CMA

## 2017-03-20 NOTE — TELEPHONE ENCOUNTER
Updated RX for 90 day supply for him to be sent to mail order, but patient is due for in clinic follow up with Dr. Wilde and MTM after April 12th for DM recheck.     Can you please let the patient know to set up these appointments?     Thank you!    Ree Newell, Pharm.D, [facility]  Medication Therapy Management Pharmacist  [phone]

## 2017-03-20 NOTE — TELEPHONE ENCOUNTER
Message from [facility]:  Reg Ortizrick Hever would like a refill of the following medications:  insulin glargine (LANTUS SOLOSTAR) 100 UNIT/ML injection [Zana Trejo MD]    Preferred pharmacy: [facility] - [place], [place] - [address]    Comment:

## 2017-03-27 ENCOUNTER — DOCUMENTATION ONLY (OUTPATIENT)
Dept: SLEEP MEDICINE | Facility: CLINIC | Age: 47
End: 2017-03-27

## 2017-03-27 NOTE — PROGRESS NOTES
30 DAY [facility] VISIT    Message left for patient to return call     Assessment: Pt meeting objective benchmarks.     Action plan: Waiting for patient to return call.  and Pt to have 6 month [facility] visit  Patient has a follow up visit with Bennett Goltz, PA on 04/13/2017.   Device settings:    EPAP Min Auto CPAP 5 (CPAP Min Auto CPAP)    EPAP Max Auto CPAP: 15 (CPAP Max Auto CPAP)    Avg EPAP pressure (90th %ile) 14 day average (Shania): 7.6cm H20    Objective measures: 14 day rolling measures        % compliance greater than four hours rolling average 14 days: 85.71 %         Average % of night in large leak Rolling Average 14 days (SHANIA): 1%  last upload      AHI Rolling Average 14 Day: 1.1 last upload       Time mask on face 14 day average: 323 min        Objective measure goal  Compliance   Goal >70%  Leak   Goal < 10%  AHI  Goal < 5  Usage  Goal >240

## 2017-04-13 ENCOUNTER — OFFICE VISIT (OUTPATIENT)
Dept: SLEEP MEDICINE | Facility: CLINIC | Age: 47
End: 2017-04-13
Payer: COMMERCIAL

## 2017-04-13 VITALS
OXYGEN SATURATION: 98 % | HEIGHT: 69 IN | HEART RATE: 94 BPM | SYSTOLIC BLOOD PRESSURE: 141 MMHG | WEIGHT: 253 LBS | BODY MASS INDEX: 37.47 KG/M2 | TEMPERATURE: 97 F | DIASTOLIC BLOOD PRESSURE: 81 MMHG

## 2017-04-13 DIAGNOSIS — G47.00 INSOMNIA, UNSPECIFIED TYPE: ICD-10-CM

## 2017-04-13 DIAGNOSIS — G47.33 OSA (OBSTRUCTIVE SLEEP APNEA): Primary | ICD-10-CM

## 2017-04-13 PROCEDURE — 99214 OFFICE O/P EST MOD 30 MIN: CPT | Performed by: PHYSICIAN ASSISTANT

## 2017-04-13 ASSESSMENT — PAIN SCALES - GENERAL: PAINLEVEL: NO PAIN (0)

## 2017-04-13 NOTE — PROGRESS NOTES
Sleep Study Follow-Up Visit:    Date on this visit: 4/13/2017    Reg Kathleen comes in today for follow-up of his CPAP use for mild positional GILDA. He was initially seen at the [facility] for loud snoring and feeling unrefreshed from sleep. He wakes feeling out of breath and has a family history of sleep apnea. These symptoms have been occurring for almost 2 decades. His medical history is significant for DM type 2, GERD and TMJ disorder. He has 3 siblings with GILDA.     HST results:   AHI: 14.6/hr VALENTINA: 25.5/hr Supine AHI: 46.9/hr Lateral AHI: 5.5/hr on left, 4/hr on right and 7.3/hr prone   Average SpO2: 91% Lowest Desaturation: 77%. Time Spent Below 89%: 41.3 minutes    He is on auto CPAP 5-15 cm. For the most part, his experience has been pretty good. He expected to feel better energy though. His ESS is 14/24 today and was 6/24 at his consult last October. He does feel less sleepy though. He is noticing he has occasional nights where he has some trouble sleeping after a couple of nights of good sleep. He wakes once for the restroom nightly. He sometimes won't put it back on after coming back to bed, if he feels like he is fighting the pressure. Occasionally, he falls asleep without it. He is not snoring with it on. He uses a nasal mask. He has some leak when he rolls to his side. He has less dry mouth with CPAP. CPAP blows through his congestion. He has been experimenting with the humidity settings. He feels the pressure is too strong when he puts the mask back on after getting up. He does not usually nap. He mentions his wife gets up before him to help get their daughter off to school. She hits the snooze for about 30 minutes, which interferes with his sleep.    The compliance data shows that he has used the CPAP for 29/30 nights, 90% of nights for >4 hours.  The 90th% pressure is 7.9 cm.  The average time in large leak is 2 min.  The average nightly usage is 6:17.  The average AHI is  1.3/hr. His sleep schedule is a bit inconsistent. He has occasional nights of staying up to 3-4 AM and only getting 4-5 hours of sleep. He also then has nights of getting 8-9 hours of sleep, often on weekends.      Past medical/surgical history, family history, social history, medications and allergies were reviewed.      Problem List:  Patient Active Problem List    Diagnosis Date Noted     GILDA (obstructive sleep apnea) 01/24/2017     Priority: Medium     Obesity due to excess calories, unspecified obesity severity 01/12/2017     Priority: Medium     Uncontrolled diabetes with kidney complications (H) 12/02/2015     Priority: Medium     ACP (advance care planning) 09/04/2015     Priority: Medium     Advance Care Planning 9/4/2015: ACP Review and Resources Provided:  Reviewed chart for advance care plan.  Reg Kathleen has no plan or code status on file. Discussed available resources and provided with information. Confirmed code status reflects current choices pending further ACP discussions.  Confirmed/documented legally designated decision maker(s). Added by KENISHA THEODORE             Esophageal reflux 12/11/2013     Priority: Medium     Health Care Home 12/19/2012     Priority: Medium     State Tier Level:  0  Status:  n/a  Care Coordinator:     See Letters for H Care Plan              Impression/Plan:    (G47.33) GILDA (obstructive sleep apnea)  (primary encounter diagnosis)  Comment: He is doing well with CPAP  Plan: Continue auto CPAP 5-15 cm. We reviewed the schedule for getting new supplies and changing his filters. He was shown online sites for supplies if he has a very high deductible.    (G47.00) Insomnia, unspecified type  Comment: He sometimes wakes about an hour before his alarm and has trouble getting back to sleep with the CPAP on. His wife wakes earlier than him and hits snooze for about 30 minutes. His sleep schedule is irregular.   Plan: We talked about how irregular sleep scheduling  could be contributing to his residual sleepiness and insomnia. He was encouraged to aim to be in bed for about 7.5 hours and try to be as consistent as possible about his bedtime and wake time. I printed out tips on good sleep hygiene, including advice to not use the snooze button, so that he can show that to his wife.      He will follow up with me in about 1 year(s), sooner if he continues to have insomnia.     Twenty-five minutes spent with patient, all of which were spent face-to-face counseling, consulting, coordinating plan of care.      Bennett Goltz, PA-C    CC: No ref. provider found

## 2017-04-13 NOTE — MR AVS SNAPSHOT
After Visit Summary   4/13/2017    Reg Kathleen    MRN: [id number]           Patient Information     Date Of Birth          1970        Visit Information        Provider Department      4/13/2017 1:00 PM Goltz, Bennett Ezra, PA-C [facility]        Today's Diagnoses     GILDA (obstructive sleep apnea)    -  1      Care Instructions    General recommendations for sleep problems (Insomnia)  Allow 2-4 weeks to see results     Establish a regular sleep schedule    Most people only need 7-8 hours of sleep.  Don't be in bed longer than you need     to sleep or you will end up spending more time awake in bed. This trains your    brain to think of the bed as a place to not sleep.  Go to bed at same time each night   Get up at same time each day - Set an alarm everyday (even weekends). This is one of    the most important tips. It prevents you from relying on your insomnia to get you    up on time for your day. That actually reinforces insomnia. It also will help your    body get into a pattern where you start feeling tired at a consistent time each    night.  The body functions best when you keep a consistent routine.  Avoid sleeping-in and napping. Anytime you sleep during the day, you will be less tired at    night. You may be tired enough to fall asleep, but you will wake more in the    middle of the night because you will have met your sleep need before the night is    done.   Cut down time in bed (if not asleep, get up)- Use your bed only for sleep and sex    Anytime you spend time in bed doing activities other than sleep (reading,    watching TV, working, playing on the computer or phone, or even just laying in    bed trying to sleep), you are training your brain to think of the bed as a place to    do activities other than sleep. If you are not falling asleep within 20-30 minutes,    get out of bed. While out of bed, avoid bright lights. Avoid work or chores. Being     productive in the middle of the night reinforces waking up at night. Find relaxing,    not particularly entertaining activities like reading, listening to music, or relaxation    exercises. Go back to bed if you start feeling groggy, or after about 30 minutes,    even if not feeling very tired. Sometimes, just getting out of bed stops the pattern    of getting frustrated about laying in bed not sleeping, and that can help you fall    asleep.   Avoid trying to force yourself to sleep- sleep is not like everything else. The harder you    work at most things, the more you can accomplish. The harder you work at    sleep, the less you will sleep.     Make the bedroom comfortable - quiet, dark and cool are better. Consider ear plugs    (silicon). Use dark blinds or wear an eye mask if needed     Make a relaxing routine prior to bedtime  Relaxation exercises:   Progressive muscle relaxation: Relax each muscle group individually    Begin with your feet, flex, then relax. Try to imagine your feet feeling heavy and sinking into the bed. Move to your calves, do the same thing. Work through each muscle group toward your head.    Relaxing Mental Imagery: Try to imagine a trip that you took and found relaxing, or imagine a day at the beach. Try to walk yourself through the day in your mind as if you were dreaming it. Try to imagine sensing the different experiences, such as feeling sand between your toes, the heat of the sun on your skin, seeing the waves crashing the shore, the smell of the salt water, etc.     Deal with your worries before bedtime    Set aside a worry time around dinner time for 10-15 minutes. Write down the    things that are on your mind. Plan time in the coming days to address those    issues. Brainstorm ideas on how you will deal with them. Try to identify issues    that are out of your control, and try to let those issues go.  Listen to relaxation tapes   Classical Music or Nature sounds   Back Massage    Get regular exercise each day (at least 1-2 hours before bedtime)   Take medications only as directed   Eat a light bedtime snack or warm drink   Warm milk   Warm herbal tea (non-caffeinated)       Things to avoid   No overstimulating activities just before bed   No competitive games before bedtime   No exciting television programs before bedtime   Avoid caffeine after lunchtime   Avoid chocolate   Do not use alcohol to induce sleep (worsens Insomnia)   Do not take someone else's sleeping pills   Do not look at the clock when awakening   Do not use the snooze button as that just makes the sleep over that time poorer quality  Do not turn on light when getting up to use bathroom, use a nightlight     Online Programs     www.VitaFlavor (pronounced shut eye). There is a fee for this program. Enter the code  Alexander  if you decide to enroll in this program.      www.sleepIO.com (pronounced sleep ee oh). There is a fee for this program. Enter the code  Alexander  if you decide to enroll in this program.     Suggested Resources  Insomnia Treatment Books     Overcoming Insomnia by Aayush Mock and Andie Baird (2008)    No More Sleepless Nights by Tad Graff and Jaycee Mckeon (1996)    Say Shahida to Insomnia by Brenton Qureshi (2009)    The Insomnia Workbook by Zoe Carrington and Rafita Hernandez (2009)    The Insomnia Answer by Gregorio Stiles and Jarrell Allen (2006)      Stress Management and Relaxation Books    The Relaxation and Stress Reduction Workbook by Bailey Davenport, Julianna Rooney and Mariano Charlton (2008)    Stress Management Workbook: Techniques and Self-Assessment Procedures by Janeen Nicole and Moy Arcos (1997)    A Mindfulness-Based Stress Reduction Workbook by Kelvin Portillo and Melodie Winchester (2010)    The Complete Stress Management Workbook by Dat Cannon, Kolby Wilson and Prosper Spears (1996)    Assert Yourself by Nina Devi and Joaquim Devi  (1977)    Relaxation Resources for Computer Download   These websites offer resources to help you relax. This list is for information only. [facility] is not responsible for the quality of services or the actions of any person or organization.  Progressive Muscle Relaxation (PMR):     http://www.WUT/progressive-muscle-relaxation-exercise.html     http://studentsupport.[facility]/counseling/resources/self-help/relaxation-and-stress-management/   Deep Breathing Exercises:    http://www.WUT/breathing-awareness.html     Meditation:     wwwPrevention Pharmaceuticals    www.ConductorguidedFishidymeditationFishidysite.Oxigene You may have to pay for some of these resources.    Guided Imagery:    http://www.WUT/guided-imagery-scripts.html     http://[employer]/library/uezxtukpwy-kynttn-gxmgjcp/     Counseling / Behavioral Health  [facility]  Visit [email or web address] or call [phone] to find a clinic close to you.  Or call [phone] for [facility].    Your BMI is Body mass index is 36.96 kg/(m^2).  Weight management is a personal decision.  If you are interested in exploring weight loss strategies, the following discussion covers the approaches that may be successful. Body mass index (BMI) is one way to tell whether you are at a healthy weight, overweight, or obese. It measures your weight in relation to your height.  A BMI of 18.5 to 24.9 is in the healthy range. A person with a BMI of 25 to 29.9 is considered overweight, and someone with a BMI of 30 or greater is considered obese. More than two-thirds of American adults are considered overweight or obese.  Being overweight or obese increases the risk for further weight gain. Excess weight may lead to heart disease and diabetes.  Creating and following plans for healthy eating and physical activity may help you improve your health.  Weight control is part of healthy lifestyle and  includes exercise, emotional health, and healthy eating habits. Careful eating habits lifelong are the mainstay of weight control. Though there are significant health benefits from weight loss, long-term weight loss with diet alone may be very difficult to achieve- studies show long-term success with dietary management in less than 10% of people. Attaining a healthy weight may be especially difficult to achieve in those with severe obesity. In some cases, medications, devices and surgical management might be considered.  What can you do?  If you are overweight or obese and are interested in methods for weight loss, you should discuss this with your provider.     Consider reducing daily calorie intake by 500 calories.     Keep a food journal.     Avoiding skipping meals, consider cutting portions instead.    Diet combined with exercise helps maintain muscle while optimizing fat loss. Strength training is particularly important for building and maintaining muscle mass. Exercise helps reduce stress, increase energy, and improves fitness. Increasing exercise without diet control, however, may not burn enough calories to loose weight.       Start walking three days a week 10-20 minutes at a time    Work towards walking thirty minutes five days a week     Eventually, increase the speed of your walking for 1-2 minutes at time    In addition, we recommend that you review healthy lifestyles and methods for weight loss available through the National Institutes of Health patient information sites:  http://win.niddk.nih.gov/publications/index.htm    And look into health and wellness programs that may be available through your health insurance provider, employer, local community center, or [employer]on club.    Weight management plan: Patient was referred to their PCP to discuss a diet and exercise plan.            Follow-ups after your visit        Your next 10 appointments already scheduled     Apr 20, 2017  8:30 AM CDT   Office  "Visit with Kaci Wilde MD   [facility] ([facility])    [address]  [address]  [facility] [id number] [phone]            Apr 20, 2017  9:30 AM CDT   SHORT with Ree Newell [facility] ([facility])    [address]  [address]  [place] [id number] [phone]              Who to contact     If you have questions or need follow up information about today's clinic visit or your schedule please contact [facility] directly at [phone].  Normal or non-critical lab and imaging results will be communicated to you by FABPuloushart, letter or phone within 4 business days after the clinic has received the results. If you do not hear from us within 7 days, please contact the clinic through FABPuloushart or phone. If you have a critical or abnormal lab result, we will notify you by phone as soon as possible.  Submit refill requests through Jobzippers or call your pharmacy and they will forward the refill request to us. Please allow 3 business days for your refill to be completed.          Additional Information About Your Visit        [email or web address] Information     Jobzippers gives you secure access to your electronic health record. If you see a primary care provider, you can also send messages to your care team and make appointments. If you have questions, please call your primary care clinic.  If you do not have a primary care provider, please call [phone] and they will assist you.        Care EveryWhere ID     This is your Care EveryWhere ID. This could be used by other organizations to access your [facility] medical records  [id number]        Your Vitals Were     Pulse Temperature Height Pulse Oximetry BMI (Body Mass Index)       94 97  F (36.1  C) 1.762 m (5' 9.37\") 98% 36.96 kg/m2        Blood Pressure from Last 3 Encounters:   04/13/17 141/81   01/12/17 112/72   10/14/16 108/68    " Weight from Last 3 Encounters:   04/13/17 114.8 kg (253 lb)   01/12/17 111.6 kg (246 lb)   10/14/16 112.6 kg (248 lb 3.2 oz)              Today, you had the following     No orders found for display         Today's Medication Changes          These changes are accurate as of: 4/13/17  2:06 PM.  If you have any questions, ask your nurse or doctor.               Stop taking these medicines if you haven't already. Please contact your care team if you have questions.     atorvastatin 10 MG tablet   Commonly known as:  LIPITOR   Stopped by:  Goltz, Bennett Ezra, PA-C           azelastine 0.1 % spray   Commonly known as:  ASTELIN   Stopped by:  Goltz, Bennett Ezra, PA-C           guaiFENesin 600 MG 12 hr tablet   Commonly known as:  MUCINEX   Stopped by:  Goltz, Bennett Ezra, PA-C           ibuprofen 600 MG tablet   Commonly known as:  ADVIL/MOTRIN   Stopped by:  Goltz, Bennett Ezra, PA-C           losartan 50 MG tablet   Commonly known as:  COZAAR   Stopped by:  Goltz, Bennett Ezra, PA-C                    Primary Care Provider Office Phone # Fax #    Kaci Wilde [phone]       [facility] [address] [place] [address]        Thank you!     Thank you for choosing [facility]  for your care. Our goal is always to provide you with excellent care. Hearing back from our patients is one way we can continue to improve our services. Please take a few minutes to complete the written survey that you may receive in the mail after your visit with us. Thank you!             Your Updated Medication List - Protect others around you: Learn how to safely use, store and throw away your medicines at www.disposemymeds.org.          This list is accurate as of: 4/13/17  2:06 PM.  Always use your most recent med list.                   Brand Name Dispense Instructions for use    ASPIRIN NOT PRESCRIBED    INTENTIONAL     continuous prn for other Antiplatelet  medication not prescribed intentionally due to Not indicated based on age       blood glucose monitoring test strip    ONE TOUCH TEST STRIPS    400 each    Use to test blood sugar 4 times daily or as directed. Has Verio IQ Meter.       canagliflozin 300 MG tablet    INVOKANA    90 tablet    Take 1 tablet (300 mg) by mouth every morning (before breakfast)       dulaglutide 1.5 MG/0.5ML pen    TRULICITY    6 mL    Inject 1.5 mg Subcutaneous every 7 days       insulin glargine 100 UNIT/ML injection    LANTUS SOLOSTAR    60 mL    Inject 35 Units Subcutaneous twice daily       insulin pen needle 31G X 8 MM    B-D U/F    300 each    Use 2-3 daily as directed with Novolog and Lantus. Please delete other syringe RX- needs pen needles.       metFORMIN 1000 MG tablet    GLUCOPHAGE    180 tablet    Take 1 tablet (1,000 mg) by mouth 2 times daily (with meals)       NovoLOG FLEXPEN 100 UNIT/ML injection   Generic drug:  insulin aspart     15 mL    Inject 16 units under the skin once with your dinner meal.       omeprazole 20 MG CR capsule    priLOSEC    90 capsule    Take 1 capsule (20 mg) by mouth daily

## 2017-04-13 NOTE — NURSING NOTE
"Chief Complaint   Patient presents with     Sleep Problem     Follow up steve       Initial /81  Pulse 94  Temp 97  F (36.1  C)  Ht 1.762 m (5' 9.37\")  Wt 114.8 kg (253 lb)  SpO2 98%  BMI 36.96 kg/m2 Estimated body mass index is 36.96 kg/(m^2) as calculated from the following:    Height as of this encounter: 1.762 m (5' 9.37\").    Weight as of this encounter: 114.8 kg (253 lb).  Medication Reconciliation: complete   Yvette Crenshaw MA    "

## 2017-04-13 NOTE — PATIENT INSTRUCTIONS
General recommendations for sleep problems (Insomnia)  Allow 2-4 weeks to see results     Establish a regular sleep schedule    Most people only need 7-8 hours of sleep.  Don't be in bed longer than you need     to sleep or you will end up spending more time awake in bed. This trains your    brain to think of the bed as a place to not sleep.  Go to bed at same time each night   Get up at same time each day - Set an alarm everyday (even weekends). This is one of    the most important tips. It prevents you from relying on your insomnia to get you    up on time for your day. That actually reinforces insomnia. It also will help your    body get into a pattern where you start feeling tired at a consistent time each    night.  The body functions best when you keep a consistent routine.  Avoid sleeping-in and napping. Anytime you sleep during the day, you will be less tired at    night. You may be tired enough to fall asleep, but you will wake more in the    middle of the night because you will have met your sleep need before the night is    done.   Cut down time in bed (if not asleep, get up)- Use your bed only for sleep and sex    Anytime you spend time in bed doing activities other than sleep (reading,    watching TV, working, playing on the computer or phone, or even just laying in    bed trying to sleep), you are training your brain to think of the bed as a place to    do activities other than sleep. If you are not falling asleep within 20-30 minutes,    get out of bed. While out of bed, avoid bright lights. Avoid work or chores. Being    productive in the middle of the night reinforces waking up at night. Find relaxing,    not particularly entertaining activities like reading, listening to music, or relaxation    exercises. Go back to bed if you start feeling groggy, or after about 30 minutes,    even if not feeling very tired. Sometimes, just getting out of bed stops the pattern    of getting frustrated about laying  in bed not sleeping, and that can help you fall    asleep.   Avoid trying to force yourself to sleep- sleep is not like everything else. The harder you    work at most things, the more you can accomplish. The harder you work at    sleep, the less you will sleep.     Make the bedroom comfortable - quiet, dark and cool are better. Consider ear plugs    (silicon). Use dark blinds or wear an eye mask if needed     Make a relaxing routine prior to bedtime  Relaxation exercises:   Progressive muscle relaxation: Relax each muscle group individually    Begin with your feet, flex, then relax. Try to imagine your feet feeling heavy and sinking into the bed. Move to your calves, do the same thing. Work through each muscle group toward your head.    Relaxing Mental Imagery: Try to imagine a trip that you took and found relaxing, or imagine a day at the beach. Try to walk yourself through the day in your mind as if you were dreaming it. Try to imagine sensing the different experiences, such as feeling sand between your toes, the heat of the sun on your skin, seeing the waves crashing the shore, the smell of the salt water, etc.     Deal with your worries before bedtime    Set aside a worry time around dinner time for 10-15 minutes. Write down the    things that are on your mind. Plan time in the coming days to address those    issues. Brainstorm ideas on how you will deal with them. Try to identify issues    that are out of your control, and try to let those issues go.  Listen to relaxation tapes   Classical Music or Nature sounds   Back Massage   Get regular exercise each day (at least 1-2 hours before bedtime)   Take medications only as directed   Eat a light bedtime snack or warm drink   Warm milk   Warm herbal tea (non-caffeinated)       Things to avoid   No overstimulating activities just before bed   No competitive games before bedtime   No exciting television programs before bedtime   Avoid caffeine after lunchtime    Avoid chocolate   Do not use alcohol to induce sleep (worsens Insomnia)   Do not take someone else's sleeping pills   Do not look at the clock when awakening   Do not use the snooze button as that just makes the sleep over that time poorer quality  Do not turn on light when getting up to use bathroom, use a nightlight     Online Programs     www.SHUTi.me (pronounced shut eye). There is a fee for this program. Enter the code  [facility]  if you decide to enroll in this program.      www.sleepIO.com (pronounced sleep ee oh). There is a fee for this program. Enter the code  [facility]  if you decide to enroll in this program.     Suggested Resources  Insomnia Treatment Books     Overcoming Insomnia by Aayush Mock and Andie Baird (2008)    No More Sleepless Nights by Tad Graff and Jaycee Mckeon (1996)    Say Shahida to Insomnia by Brenton Qureshi (2009)    The Insomnia Workbook by Zoe Carrington and Rafita Hernandez (2009)    The Insomnia Answer by Gregorio Stiles and Jarrell Allen (2006)      Stress Management and Relaxation Books    The Relaxation and Stress Reduction Workbook by Bailey Davenport, Julianna Rooney and Mariano Charlton (2008)    Stress Management Workbook: Techniques and Self-Assessment Procedures by Janeen Nicole and Moy Arcos (1997)    A Mindfulness-Based Stress Reduction Workbook by Kelvin Portillo and Melodie Winchester (2010)    The Complete Stress Management Workbook by Dat Cannon, Kolby Wilson and Prosper Spears (1996)    Assert Yourself by Nina Devi and Joaquim Devi (1977)    Relaxation Resources for Computer Download   These websites offer resources to help you relax. This list is for information only. [facility] is not responsible for the quality of services or the actions of any person or organization.  Progressive Muscle Relaxation (PMR):     http://www.Pixowl.com/progressive-muscle-relaxation-exercise.html      http://studentsupport.[facility]/counseling/resources/self-help/relaxation-and-stress-management/   Deep Breathing Exercises:    http://www.[employer]/breathing-awareness.html     Meditation:     www.[employer]    www.[employer] BiosciencesguidedXebiaLabsmeditation-site.[employer] You may have to pay for some of these resources.    Guided Imagery:    http://www.UbiCast/guided-imagery-scripts.html     http://[employer]/library/icjfgrcmss-yxhesg-caiecsw/     Counseling / Behavioral Health  [facility]  Visit [email or web address] or call [phone] to find a clinic close to you.  Or call [phone] for [facility].    Your BMI is Body mass index is 36.96 kg/(m^2).  Weight management is a personal decision.  If you are interested in exploring weight loss strategies, the following discussion covers the approaches that may be successful. Body mass index (BMI) is one way to tell whether you are at a healthy weight, overweight, or obese. It measures your weight in relation to your height.  A BMI of 18.5 to 24.9 is in the healthy range. A person with a BMI of 25 to 29.9 is considered overweight, and someone with a BMI of 30 or greater is considered obese. More than two-thirds of American adults are considered overweight or obese.  Being overweight or obese increases the risk for further weight gain. Excess weight may lead to heart disease and diabetes.  Creating and following plans for healthy eating and physical activity may help you improve your health.  Weight control is part of healthy lifestyle and includes exercise, emotional health, and healthy eating habits. Careful eating habits lifelong are the mainstay of weight control. Though there are significant health benefits from weight loss, long-term weight loss with diet alone may be very difficult to achieve- studies show long-term success with dietary management in less than 10% of people. Attaining a  healthy weight may be especially difficult to achieve in those with severe obesity. In some cases, medications, devices and surgical management might be considered.  What can you do?  If you are overweight or obese and are interested in methods for weight loss, you should discuss this with your provider.     Consider reducing daily calorie intake by 500 calories.     Keep a food journal.     Avoiding skipping meals, consider cutting portions instead.    Diet combined with exercise helps maintain muscle while optimizing fat loss. Strength training is particularly important for building and maintaining muscle mass. Exercise helps reduce stress, increase energy, and improves fitness. Increasing exercise without diet control, however, may not burn enough calories to loose weight.       Start walking three days a week 10-20 minutes at a time    Work towards walking thirty minutes five days a week     Eventually, increase the speed of your walking for 1-2 minutes at time    In addition, we recommend that you review healthy lifestyles and methods for weight loss available through the National Institutes of Health patient information sites:  http://win.niddk.nih.gov/publications/index.htm    And look into health and wellness programs that may be available through your health insurance provider, employer, local community center, or [employer].    Weight management plan: Patient was referred to their PCP to discuss a diet and exercise plan.

## 2017-04-20 ENCOUNTER — OFFICE VISIT (OUTPATIENT)
Dept: FAMILY MEDICINE | Facility: CLINIC | Age: 47
End: 2017-04-20

## 2017-04-20 ENCOUNTER — OFFICE VISIT (OUTPATIENT)
Dept: PHARMACY | Facility: PHYSICIAN GROUP | Age: 47
End: 2017-04-20
Payer: COMMERCIAL

## 2017-04-20 VITALS
TEMPERATURE: 97.6 F | SYSTOLIC BLOOD PRESSURE: 120 MMHG | OXYGEN SATURATION: 95 % | HEART RATE: 76 BPM | BODY MASS INDEX: 36.16 KG/M2 | DIASTOLIC BLOOD PRESSURE: 80 MMHG | WEIGHT: 252.6 LBS | HEIGHT: 70 IN

## 2017-04-20 DIAGNOSIS — E78.5 HYPERLIPIDEMIA LDL GOAL <100: ICD-10-CM

## 2017-04-20 DIAGNOSIS — I10 BENIGN ESSENTIAL HYPERTENSION: Primary | ICD-10-CM

## 2017-04-20 LAB — HBA1C MFR BLD: 7.5 % (ref 4–7)

## 2017-04-20 PROCEDURE — 83036 HEMOGLOBIN GLYCOSYLATED A1C: CPT | Performed by: FAMILY MEDICINE

## 2017-04-20 PROCEDURE — 80048 BASIC METABOLIC PNL TOTAL CA: CPT | Mod: 90 | Performed by: FAMILY MEDICINE

## 2017-04-20 PROCEDURE — 99606 MTMS BY PHARM EST 15 MIN: CPT | Performed by: PHARMACIST

## 2017-04-20 PROCEDURE — 80061 LIPID PANEL: CPT | Mod: 90 | Performed by: FAMILY MEDICINE

## 2017-04-20 PROCEDURE — 36415 COLL VENOUS BLD VENIPUNCTURE: CPT | Performed by: FAMILY MEDICINE

## 2017-04-20 PROCEDURE — 99213 OFFICE O/P EST LOW 20 MIN: CPT | Performed by: FAMILY MEDICINE

## 2017-04-20 RX ORDER — ROSUVASTATIN CALCIUM 5 MG/1
5 TABLET, COATED ORAL DAILY
Qty: 90 TABLET | Refills: 0 | Status: SHIPPED | OUTPATIENT
Start: 2017-04-20 | End: 2017-08-18

## 2017-04-20 RX ORDER — LOSARTAN POTASSIUM 25 MG/1
25 TABLET ORAL DAILY
Qty: 90 TABLET | Refills: 0 | Status: SHIPPED | OUTPATIENT
Start: 2017-04-20 | End: 2017-08-18

## 2017-04-20 NOTE — PATIENT INSTRUCTIONS
Recommendations from today's MTM visit:                                                    MTM (medication therapy management) is a service provided by a clinical pharmacist designed to help you get the most of out of your medicines.   Today we reviewed what your medicines are for, how to know if they are working, that your medicines are safe and how to make your medicine regimen as easy as possible.     1. Start losartan 25mg daily for kidney protection (blood pressure)- lower dose than before.    2. Start rosuvastatin 5mg daily for heart protection (cholesterol) - generic for Crestor    3. Referral to endocrinology to help with blood sugars.    Next MTM visit: 3 months     To schedule another MTM appointment, please call the clinic directly or you may call the MTM scheduling line at [phone] or toll-free at [phone].     My Clinical Pharmacist's contact information:                                                      It was a pleasure seeing you today!  Please feel free to contact me with any questions or concerns you have.      Ree Newell, Pharm.D, [facility]  Medication Therapy Management Pharmacist  [phone]    You may receive a survey about the MTM services you received.  I would appreciate your feedback to help me serve you better in the future. Please fill it out and return it when you can. Your comments will be anonymous.      My healthcare goals:                                                      Diabetes Goals:    Home Monitoring of Blood Sugars:Fasting  mg/dL and 2 hours after a meal less than 150 mg/dL.    Hemoglobin A1C: Less than 7%. Yours is   Lab Results   Component Value Date    A1C 7.5 04/20/2017   .    Blood Pressure: Less than 140/90mmHg. Yours is There were no vitals taken for this visit..    Cholesterol: You are taking rosuvastatin to help decrease the risk of heart disease. (Restart Crestor 5mg daily)     Things you can do to help lower your blood sugars:    Diet: 3  meals and 1-2 snacks per day with 60-75 grams of carbohydrates per meal and 15-30 grams of carbohydrates per snack. Try to fill your plate at least half-full with vegetables, fill one-quarter full with lean meats or protein, and also make sure you get at least some carbohydrate with every meal.    Exercise: 30 minutes per day of anything that will increase your heart rate and make you break a sweat! Gardening, walking, cleaning the house, changing the oil in your car, etc. If you feel like 30 minutes per day is too much, start small. Even lifting canned foods or working your arms with a resistance band in front of the TV can help.

## 2017-04-20 NOTE — PROGRESS NOTES
SUBJECTIVE/OBJECTIVE:                                                    Reg Kathleen is a 46 year old male coming in for a follow-up visit for Medication Therapy Management.  He was referred to me from .     Chief Complaint: Follow up from our visit on 3/10.  Saw PCP today also.  Tobacco: No tobacco use   Alcohol: 7-9 beverages / week    Medication Adherence: self-adjusts regimen.    Diabetes/HTN/HLD:  Pt currently taking Metformin 1000mg BID, Lantus 35 BID, Trulicity 1.5mg weekly and Invokana 300mg once daily and using Novolog 15 units with dinner now (previously dosing post meal sporadically) and occasionally will take 15 units with breakfast.Pt is not experiencing side effects.  [id number] lately  Poor diet and exercise efforts lately - going to the gym 1x per week now   He states that the increased insulin makes his sugars higher, but has not been following through with positive lifestyle changes. We have not been able to make forward progress for some time now.   Feels he eats more when his doses are higher on the Lantus so he finds it is not helpful to increase (so he is staying at 35 units BID). Eating 3 meals a day, but his breakfast and lunch are pretty small. Does snack after dinner and usually drinking several alcoholic drinks most nights of the week- has not been clear with his timing of drinking in relationship to his blood sugar readings.   Having hypoglycemia? No, but does get feelings of lows midday if he is exercising a lot (never has been <80mg/dl though)   Symptoms of hyperglycemia? none  ACEi/ARB: Yes- losartan prescribed, but patient is not regularly taking it because he thinks he gets dizzy or a cough or other side effects - Microalbumin is < 30 mg/g.  This has been restarted by Dr. Wilde today. Reinforced the importance of this today.   Aspirin: Not taking due to age  Statin: Yes- myalgias since on medication, PCP reordered this at Crestor low dose today- encouraged  starting this as well. Rarely takes it due to side effect concerns, has agreed to take occassionally  Tobacco Use-  reports that he quit smoking about 18 years ago. His smoking use included Cigarettes. He has a 30 pack-year smoking history. He has never used smokeless tobacco.    Current labs include:  BP Readings from Last 3 Encounters:   04/20/17 120/80   04/13/17 141/81   01/12/17 112/72     Today's Vitals: There were no vitals taken for this visit.  Lab Results   Component Value Date    A1C 7.5 04/20/2017   .  Lab Results   Component Value Date    CHOL 222 10/07/2016     Lab Results   Component Value Date    TRIG 220 10/07/2016     Lab Results   Component Value Date    HDL 48 10/07/2016     Lab Results   Component Value Date     10/07/2016       Liver Function Studies -   Recent Labs   Lab Test  10/07/16   0901   PROTTOTAL  7.3   ALBUMIN  4.3   BILITOTAL  0.4   ALKPHOS  59   AST  17   ALT  20   BILIDIRECT  0.1       Lab Results   Component Value Date    UMALCR <30 02/12/2016       Last Basic Metabolic Panel:  Lab Results   Component Value Date     10/07/2016      Lab Results   Component Value Date    POTASSIUM 4.3 10/07/2016     Lab Results   Component Value Date    CHLORIDE 103 10/07/2016     Lab Results   Component Value Date    BUN 16 10/07/2016    BUN NOT APPLICABLE 10/07/2016     Lab Results   Component Value Date    CR 1.09 10/07/2016     GFR Estimate   Date Value Ref Range Status   10/07/2016 82 > OR = 60 mL/min/1.73m2 Final   06/03/2016 80 > OR = 60 mL/min/1.73m2 Final   01/14/2016 72 > OR = 60 mL/min/1.73m2 Final     No results found for: GFRESTBLACK  TSH   Date Value Ref Range Status   01/12/2017 3.42 0.40 - 4.50 mIU/L Final   ]    Most Recent Immunizations   Administered Date(s) Administered     Hepatitis A Vac Ped/Adol-2 Dose 09/06/2011     Hepatitis B 03/08/2012     IPV 09/06/2011     Influenza (IIV3) 09/06/2011     Influenza Vaccine IM 3yrs+ 4 Valent IIV4 10/07/2016     Influenza  Vaccine IM Ages 6-35 Months 4 Valent (PF) 10/31/2015     MMR 09/06/2011     Pneumococcal 23 valent 05/08/2015     Tdap (Adacel,Boostrix) 03/09/2010     Typhoid IM 01/28/2011     ASSESSMENT:                                                    Current medications were reviewed today as discussed above.      Medication Adherence: no issues identified    Diabetes/HTN/HLD: Needs Improvement. Patient is not meeting A1c goal of < 7%. Self monitoring of blood glucose is not at goal of fasting  mg/dL. Pt would benefit from additional medication adjustments, however patient continues to self adjust and insist the higher doses make his sugars higher- pt would benefit from referral to endocrinology.  Pt should stay with ARB and Statin tx.     PLAN:                                                      1. Encouraged resuming statin and ARB    2. Referral to endocrinology    I spent 10 mins with this patient today.  All changes were made via verbal approval with Kaci Wilde. A copy of the visit note was provided to the patient's primary care provider.     Will follow up in 3 months.    The patient was given a summary of these recommendations as an after visit summary.    Ree Newell, Pharm.D, BCACP  Medication Therapy Management Pharmacist  [phone]

## 2017-04-20 NOTE — PROGRESS NOTES
SUBJECTIVE:                                                    Reg Kathleen is a 46 year old male who presents to clinic today for the following health issues:     Diabetes Follow-up      Patient is checking blood sugars: Daily:    Fasting- 150-170-     Postprandial 210-    Diabetic concerns: None     Symptoms of hypoglycemia (low blood sugar): none     Paresthesias (numbness or burning in feet) or sores: Yes mild tingling in his feet     Date of last diabetic eye exam: last on record 5/2014-       Amount of exercise or physical activity: working long hours- not currently participating in a  regular exercise program    Problems taking medications regularly: Yes,  side effects- does not take statins consistently- cognitive concerns- lisinopril makes him cough- as does ARB    Medication side effects: above    Diet: diabetic- regular alcohol intake    reviewed MTM note: Pt currently taking Metformin 1000mg BID, Lantus 35 BID (he self adjusts despite being told to keep this consistent), Trulicity 1.5mg weekly and Invokana 300mg once daily and using Novolog 15 units with dinner now (previously dosing post meal sporadically). Pt is not experiencing side effects    Using CPAP - not feeling more rested-does not see benefit  [place] was last business trip- however working long hours    Problem list and histories reviewed & adjusted, as indicated.  Additional history: as documented    Patient Active Problem List   Diagnosis     Health Care Home     Esophageal reflux     ACP (advance care planning)     Uncontrolled diabetes with kidney complications (H)     Obesity due to excess calories, unspecified obesity severity     GILDA (obstructive sleep apnea)     Past Surgical History:   Procedure Laterality Date     C NONSPECIFIC PROCEDURE      leg injury age 22/ fasciotomy??     DENTAL SURGERY      four wisdom teeth       Social History   Substance Use Topics     Smoking status: Former Smoker     Packs/day: 0.10      Years: 10.00     Types: Cigarettes     Quit date: 1998     Smokeless tobacco: Never Used      Comment: 3 cigarettes per day, smoked socially     Alcohol use 3.6 oz/week     6 Standard drinks or equivalent per week      Comment: 4 days per week, 3 drinks, more on weekends     Family History   Problem Relation Age of Onset     CANCER Mother      lymphoma     DIABETES Mother       age 83     DIABETES Father       age 73     Sleep Apnea Sister      2 sisters and 1 brother     Coronary Artery Disease No family hx of          Current Outpatient Prescriptions   Medication Sig Dispense Refill     insulin glargine (LANTUS SOLOSTAR) 100 UNIT/ML injection Inject 35 Units Subcutaneous twice daily 60 mL 0     canagliflozin (INVOKANA) 300 MG tablet Take 1 tablet (300 mg) by mouth every morning (before breakfast) 90 tablet 0     metFORMIN (GLUCOPHAGE) 1000 MG tablet Take 1 tablet (1,000 mg) by mouth 2 times daily (with meals) 180 tablet 0     dulaglutide (TRULICITY) 1.5 MG/0.5ML pen Inject 1.5 mg Subcutaneous every 7 days 6 mL 0     insulin aspart (NOVOLOG FLEXPEN) 100 UNIT/ML soln Inject 16 units under the skin once with your dinner meal. 15 mL 0     omeprazole (PRILOSEC) 20 MG capsule Take 1 capsule (20 mg) by mouth daily 90 capsule 3     blood glucose monitoring (ONE TOUCH TEST STRIPS) test strip Use to test blood sugar 4 times daily or as directed. Has Verio IQ Meter. 400 each 3     insulin pen needle (B-D U/F) 31G X 8 MM Use 2-3 daily as directed with Novolog and Lantus. Please delete other syringe RX- needs pen needles. 300 each PRN     ASPIRIN NOT PRESCRIBED, INTENTIONAL, continuous prn for other Antiplatelet medication not prescribed intentionally due to Not indicated based on age         Reviewed and updated as needed this visit by clinical staff  Tobacco  Allergies  Meds  Problems       Reviewed and updated as needed this visit by Provider         ROS:  C: NEGATIVE for fever, chills, change in  "weight  E/M: NEGATIVE for ear, mouth and throat problems  R: NEGATIVE for significant cough or SOB  CV: NEGATIVE for chest pain, palpitations or peripheral edema    OBJECTIVE:                                                    /80 (BP Location: Left arm, Patient Position: Chair, Cuff Size: Adult Large)  Pulse 76  Temp 97.6  F (36.4  C) (Oral)  Ht 1.778 m (5' 10\")  Wt 114.6 kg (252 lb 9.6 oz)  SpO2 95%  BMI 36.24 kg/m2  Body mass index is 36.24 kg/(m^2).   120/66  Regular rate and  rhythm. S1 and S2 normal, no murmurs, clicks, gallops or rubs. No edema or JVD. Chest is clear; no wheezes or rales.  Normal foot exam including filament testing.    Diagnostic Test Results:  Results for orders placed or performed in visit on 04/20/17   BASIC METABOLIC PANEL ([facility])   Result Value Ref Range    Glucose 173 (H) 65 - 99 mg/dL    Urea Nitrogen 14 7 - 25 mg/dL    Creatinine 1.09 0.60 - 1.35 mg/dL    GFR Estimate 82 > OR = 60 mL/min/1.73m2    EGFR African American 95 > OR = 60 mL/min/1.73m2    BUN/Creatinine Ratio NOT APPLICABLE 6 - 22 (calc)    Sodium 140 135 - 146 mmol/L    Potassium 4.6 3.5 - 5.3 mmol/L    Chloride 104 98 - 110 mmol/L    Carbon Dioxide 25 20 - 31 mmol/L    Calcium 9.6 8.6 - 10.3 mg/dL   Lipid Profile   Result Value Ref Range    Cholesterol 222 (H) 125 - 200 mg/dL    HDL Cholesterol 48 > OR = 40 mg/dL    Triglycerides 270 (H) <150 mg/dL    LDL Cholesterol Calculated 120 <130 mg/dL (calc)    Cholesterol/HDL Ratio 4.6 < OR = 5.0 (calc)    Non HDL Cholesterol 174 (H) mg/dL (calc)   Hemoglobin A1c (BFP)   Result Value Ref Range    Hemoglobin A1C 7.5 (A) 4.0 - 7.0 %        ASSESSMENT/PLAN:                                                      Problem List Items Addressed This Visit     Uncontrolled diabetes with kidney complications (H) - Primary    Relevant Orders    BASIC METABOLIC PANEL ([facility])    Lipid Profile    VENOUS COLLECTION (Completed)    Hemoglobin A1c (BFP) (Completed)           BMI: " "  Estimated body mass index is 36.24 kg/(m^2) as calculated from the following:    Height as of this encounter: 1.778 m (5' 10\").    Weight as of this encounter: 114.6 kg (252 lb 9.6 oz).   Weight management plan: Discussed healthy diet and exercise guidelines and patient will follow up in 3 months in clinic to re-evaluate.      MEDICATIONS:        - MTM to review current medications and make recommendations for adjustments  CONSULTATION/REFERRAL to  Endocrinology- another opinion regarding the benefits of statin, ARB to reduce risk of heart and renal disease  FUTURE APPOINTMENTS:       - Follow-up visit in 3 months  Make long term commitment to lifestyle changes  Work on weight loss  Regular exercise    Kaci Wilde MD  [facility]  "

## 2017-04-20 NOTE — NURSING NOTE
Reg is here for a medication recheck.     Pre-Visit Screening :  Immunizations : up to date  Colon Screening : na  Asthma Action Test/Plan : na  PHQ9/GAD7 :  Na    Pulse - regular  My Chart - accepts    CLASSIFICATION OF OVERWEIGHT AND OBESITY BY BMI                         Obesity Class           BMI(kg/m2)  Underweight                                    < 18.5  Normal                                         18.5-24.9  Overweight                                     25.0-29.9  OBESITY                     I                  30.0-34.9                              II                 35.0-39.9  EXTREME OBESITY             III                >40                             Patient's  BMI Body mass index is 36.24 kg/(m^2).  http://hin.nhlbi.nih.gov/menuplanner/menu.cgi  Questioned patient about current smoking habits.  Pt. quit smoking some time ago.    Emiliana.DAVON Guzmán ([facility])

## 2017-04-20 NOTE — MR AVS SNAPSHOT
After Visit Summary   4/20/2017    Reg Kathleen    MRN: [id number]           Patient Information     Date Of Birth          1970        Visit Information        Provider Department      4/20/2017 8:30 AM Kaci Wilde MD [facility]        Today's Diagnoses     Diabetes mellitus due to underlying condition, uncontrolled, with diabetic nephropathy, with long-term current use of insulin (H)    -  1       Follow-ups after your visit        Additional Services     ENDOCRINOLOGY ADULT REFERRAL       Your provider has referred you to: [facility]: [facility] [phone]   [email or web address]      Please be aware that coverage of these services is subject to the terms and limitations of your health insurance plan.  Call member services at your health plan with any benefit or coverage questions.      Please bring the following to your appointment:    >>   Any x-rays, CTs or MRIs which have been performed.  Contact the facility where they were done to arrange for  prior to your scheduled appointment.    >>   List of current medications   >>   This referral request   >>   Any documents/labs given to you for this referral                  Your next 10 appointments already scheduled     Apr 12, 2018  1:00 PM CDT   Return Sleep Patient with Bennett Ezra Goltz, PA-C   [facility] ([facility])    [address] [place] [address] [phone]              Who to contact     If you have questions or need follow up information about today's clinic visit or your schedule please contact [facility] directly at [phone].  Normal or non-critical lab and imaging results will be communicated to you by MyChart, letter or phone within 4 business days after the clinic has received the results. If you do not hear from us within 7 days, please contact the  "clinic through "[employer] or phone. If you have a critical or abnormal lab result, we will notify you by phone as soon as possible.  Submit refill requests through "[employer] or call your pharmacy and they will forward the refill request to us. Please allow 3 business days for your refill to be completed.          Additional Information About Your Visit        [employer] Information     "[employer] gives you secure access to your electronic health record. If you see a primary care provider, you can also send messages to your care team and make appointments. If you have questions, please call your primary care clinic.  If you do not have a primary care provider, please call [phone] and they will assist you.        Care EveryWhere ID     This is your Care EveryWhere ID. This could be used by other organizations to access your [place] medical records  [id number]        Your Vitals Were     Pulse Temperature Height Pulse Oximetry BMI (Body Mass Index)       76 97.6  F (36.4  C) (Oral) 1.778 m (5' 10\") 95% 36.24 kg/m2        Blood Pressure from Last 3 Encounters:   04/20/17 120/80   04/13/17 141/81   01/12/17 112/72    Weight from Last 3 Encounters:   04/20/17 114.6 kg (252 lb 9.6 oz)   04/13/17 114.8 kg (253 lb)   01/12/17 111.6 kg (246 lb)              We Performed the Following     BASIC METABOLIC PANEL (QUEST)     ENDOCRINOLOGY ADULT REFERRAL     Hemoglobin A1c (BFP)     Lipid Profile     VENOUS COLLECTION          Today's Medication Changes          These changes are accurate as of: 4/20/17 11:59 PM.  If you have any questions, ask your nurse or doctor.               Start taking these medicines.        Dose/Directions    losartan 25 MG tablet   Commonly known as:  COZAAR   Used for:  Diabetes mellitus due to underlying condition, uncontrolled, with diabetic nephropathy, with long-term current use of insulin (H)   Started by:  Kaci Wilde MD        Dose:  25 mg   Take 1 tablet (25 mg) by mouth daily   Quantity:  " 90 tablet   Refills:  0       rosuvastatin 5 MG tablet   Commonly known as:  CRESTOR   Used for:  Diabetes mellitus due to underlying condition, uncontrolled, with diabetic nephropathy, with long-term current use of insulin (H)   Started by:  Kaci Wilde MD        Dose:  5 mg   Take 1 tablet (5 mg) by mouth daily   Quantity:  90 tablet   Refills:  0            Where to get your medicines      These medications were sent to [facility] - [place], [place] - [address]  [address], [place] [address]     Phone:  [phone]     losartan 25 MG tablet    metFORMIN 1000 MG tablet    rosuvastatin 5 MG tablet                Primary Care Provider Office Phone # Fax #    Kaci Wilde [phone]       [facility] [address]  [facility] [address]        Thank you!     Thank you for choosing [facility]  for your care. Our goal is always to provide you with excellent care. Hearing back from our patients is one way we can continue to improve our services. Please take a few minutes to complete the written survey that you may receive in the mail after your visit with us. Thank you!             Your Updated Medication List - Protect others around you: Learn how to safely use, store and throw away your medicines at www.disposemymeds.org.          This list is accurate as of: 4/20/17 11:59 PM.  Always use your most recent med list.                   Brand Name Dispense Instructions for use    ASPIRIN NOT PRESCRIBED    INTENTIONAL     continuous prn for other Antiplatelet medication not prescribed intentionally due to Not indicated based on age       blood glucose monitoring test strip    ONE TOUCH TEST STRIPS    400 each    Use to test blood sugar 4 times daily or as directed. Has Verio IQ Meter.       canagliflozin 300 MG tablet    INVOKANA    90 tablet    Take 1 tablet (300 mg) by mouth every morning (before breakfast)        dulaglutide 1.5 MG/0.5ML pen    TRULICITY    6 mL    Inject 1.5 mg Subcutaneous every 7 days       insulin glargine 100 UNIT/ML injection    LANTUS SOLOSTAR    60 mL    Inject 35 Units Subcutaneous twice daily       insulin pen needle 31G X 8 MM    B-D U/F    300 each    Use 2-3 daily as directed with Novolog and Lantus. Please delete other syringe RX- needs pen needles.       losartan 25 MG tablet    COZAAR    90 tablet    Take 1 tablet (25 mg) by mouth daily       metFORMIN 1000 MG tablet    GLUCOPHAGE    180 tablet    Take 1 tablet (1,000 mg) by mouth 2 times daily (with meals)       NovoLOG FLEXPEN 100 UNIT/ML injection   Generic drug:  insulin aspart     15 mL    Inject 16 units under the skin once with your dinner meal.       omeprazole 20 MG CR capsule    priLOSEC    90 capsule    Take 1 capsule (20 mg) by mouth daily       rosuvastatin 5 MG tablet    CRESTOR    90 tablet    Take 1 tablet (5 mg) by mouth daily

## 2017-04-20 NOTE — MR AVS SNAPSHOT
After Visit Summary   4/20/2017    Reg Kathleen    MRN: [id number]           Patient Information     Date Of Birth          1970        Visit Information        Provider Department      4/20/2017 9:30 AM Ree Newell [facility]        Today's Diagnoses     Uncontrolled insulin dependent diabetes mellitus (H)          Care Instructions    Recommendations from today's MTM visit:                                                    MTM (medication therapy management) is a service provided by a clinical pharmacist designed to help you get the most of out of your medicines.   Today we reviewed what your medicines are for, how to know if they are working, that your medicines are safe and how to make your medicine regimen as easy as possible.     1. Start losartan 25mg daily for kidney protection (blood pressure)- lower dose than before.    2. Start rosuvastatin 5mg daily for heart protection (cholesterol) - generic for Crestor    3. Referral to endocrinology to help with blood sugars.    Next MTM visit: 3 months     To schedule another MTM appointment, please call the clinic directly or you may call the MTM scheduling line at [phone] or toll-free at [phone].     My Clinical Pharmacist's contact information:                                                      It was a pleasure seeing you today!  Please feel free to contact me with any questions or concerns you have.      Ree Newell, Pharm.D, [facility] [place] [facility]  Medication Therapy Management Pharmacist  [phone]    You may receive a survey about the MTM services you received.  I would appreciate your feedback to help me serve you better in the future. Please fill it out and return it when you can. Your comments will be anonymous.      My healthcare goals:                                                      Diabetes Goals:    Home Monitoring of Blood Sugars:Fasting  mg/dL and 2 hours after a meal less  than 150 mg/dL.    Hemoglobin A1C: Less than 7%. Yours is   Lab Results   Component Value Date    A1C 7.5 04/20/2017   .    Blood Pressure: Less than 140/90mmHg. Yours is There were no vitals taken for this visit..    Cholesterol: You are taking rosuvastatin to help decrease the risk of heart disease. (Restart Crestor 5mg daily)     Things you can do to help lower your blood sugars:    Diet: 3 meals and 1-2 snacks per day with 60-75 grams of carbohydrates per meal and 15-30 grams of carbohydrates per snack. Try to fill your plate at least half-full with vegetables, fill one-quarter full with lean meats or protein, and also make sure you get at least some carbohydrate with every meal.    Exercise: 30 minutes per day of anything that will increase your heart rate and make you break a sweat! Gardening, walking, cleaning the house, changing the oil in your car, etc. If you feel like 30 minutes per day is too much, start small. Even lifting canned foods or working your arms with a resistance band in front of the TV can help.                Follow-ups after your visit        Your next 10 appointments already scheduled     Apr 20, 2017  9:30 AM CDT   SHORT with Ree Newell RPH   [facility] ([facility])    [address]  [facility] [address] [phone]            Apr 12, 2018  1:00 PM CDT   Return Sleep Patient with Bennett Ezra Goltz, PA-C   [facility] ([facility])    [address] [place] [address] [address]  [facility] [address] [phone]              Who to contact     If you have questions or need follow up information about today's clinic visit or your schedule please contact [facility] directly at [phone].  Normal or non-critical lab and imaging results will be communicated to you by MyChart, letter or phone within 4 business days after the clinic has received the results. If you  do not hear from us within 7 days, please contact the clinic through [employer] or phone. If you have a critical or abnormal lab result, we will notify you by phone as soon as possible.  Submit refill requests through [employer] or call your pharmacy and they will forward the refill request to us. Please allow 3 business days for your refill to be completed.          Additional Information About Your Visit        [employer] Information     [employer] gives you secure access to your electronic health record. If you see a primary care provider, you can also send messages to your care team and make appointments. If you have questions, please call your primary care clinic.  If you do not have a primary care provider, please call [phone] and they will assist you.        Care EveryWhere ID     This is your Care EveryWhere ID. This could be used by other organizations to access your [place] medical records  [id number]         Blood Pressure from Last 3 Encounters:   04/20/17 120/80   04/13/17 141/81   01/12/17 112/72    Weight from Last 3 Encounters:   04/20/17 114.6 kg (252 lb 9.6 oz)   04/13/17 114.8 kg (253 lb)   01/12/17 111.6 kg (246 lb)              Today, you had the following     No orders found for display         Today's Medication Changes          These changes are accurate as of: 4/20/17  9:27 AM.  If you have any questions, ask your nurse or doctor.               Start taking these medicines.        Dose/Directions    losartan 25 MG tablet   Commonly known as:  COZAAR   Used for:  Diabetes mellitus due to underlying condition, uncontrolled, with diabetic nephropathy, with long-term current use of insulin (H)   Started by:  Kaci Wilde MD        Dose:  25 mg   Take 1 tablet (25 mg) by mouth daily   Quantity:  90 tablet   Refills:  0       rosuvastatin 5 MG tablet   Commonly known as:  CRESTOR   Used for:  Diabetes mellitus due to underlying condition, uncontrolled, with diabetic nephropathy, with long-term  current use of insulin (H)   Started by:  Kaci Wilde MD        Dose:  5 mg   Take 1 tablet (5 mg) by mouth daily   Quantity:  90 tablet   Refills:  0            Where to get your medicines      These medications were sent to [facility] - [place], [place] - [address]  [address], [place] [address]     Phone:  [phone]     losartan 25 MG tablet    metFORMIN 1000 MG tablet    rosuvastatin 5 MG tablet                Primary Care Provider Office Phone # Fax #    Kaci Wilde [phone]       [facility] [address]   [facility] [address]        Thank you!     Thank you for choosing [facility]  for your care. Our goal is always to provide you with excellent care. Hearing back from our patients is one way we can continue to improve our services. Please take a few minutes to complete the written survey that you may receive in the mail after your visit with us. Thank you!             Your Updated Medication List - Protect others around you: Learn how to safely use, store and throw away your medicines at www.disposemymeds.org.          This list is accurate as of: 4/20/17  9:27 AM.  Always use your most recent med list.                   Brand Name Dispense Instructions for use    ASPIRIN NOT PRESCRIBED    INTENTIONAL     continuous prn for other Antiplatelet medication not prescribed intentionally due to Not indicated based on age       blood glucose monitoring test strip    ONE TOUCH TEST STRIPS    400 each    Use to test blood sugar 4 times daily or as directed. Has Verio IQ Meter.       canagliflozin 300 MG tablet    INVOKANA    90 tablet    Take 1 tablet (300 mg) by mouth every morning (before breakfast)       dulaglutide 1.5 MG/0.5ML pen    TRULICITY    6 mL    Inject 1.5 mg Subcutaneous every 7 days       insulin glargine 100 UNIT/ML injection    LANTUS SOLOSTAR    60 mL    Inject 35 Units Subcutaneous  twice daily       insulin pen needle 31G X 8 MM    B-D U/F    300 each    Use 2-3 daily as directed with Novolog and Lantus. Please delete other syringe RX- needs pen needles.       losartan 25 MG tablet    COZAAR    90 tablet    Take 1 tablet (25 mg) by mouth daily       metFORMIN 1000 MG tablet    GLUCOPHAGE    180 tablet    Take 1 tablet (1,000 mg) by mouth 2 times daily (with meals)       NovoLOG FLEXPEN 100 UNIT/ML injection   Generic drug:  insulin aspart     15 mL    Inject 16 units under the skin once with your dinner meal.       omeprazole 20 MG CR capsule    priLOSEC    90 capsule    Take 1 capsule (20 mg) by mouth daily       rosuvastatin 5 MG tablet    CRESTOR    90 tablet    Take 1 tablet (5 mg) by mouth daily

## 2017-04-21 LAB
BUN SERPL-MCNC: 14 MG/DL (ref 7–25)
BUN/CREATININE RATIO: ABNORMAL (CALC) (ref 6–22)
CALCIUM SERPL-MCNC: 9.6 MG/DL (ref 8.6–10.3)
CHLORIDE SERPLBLD-SCNC: 104 MMOL/L (ref 98–110)
CHOLEST SERPL-MCNC: 222 MG/DL (ref 125–200)
CHOLEST/HDLC SERPL: 4.6 (CALC)
CO2 SERPL-SCNC: 25 MMOL/L (ref 20–31)
CREAT SERPL-MCNC: 1.09 MG/DL (ref 0.6–1.35)
EGFR AFRICAN AMERICAN - QUEST: 95 ML/MIN/1.73M2
GFR SERPL CREATININE-BSD FRML MDRD: 82 ML/MIN/1.73M2
GLUCOSE - QUEST: 173 MG/DL (ref 65–99)
HDLC SERPL-MCNC: 48 MG/DL
LDLC SERPL CALC-MCNC: 120 MG/DL (CALC)
NONHDLC SERPL-MCNC: 174 MG/DL (CALC)
POTASSIUM SERPL-SCNC: 4.6 MMOL/L (ref 3.5–5.3)
SODIUM SERPL-SCNC: 140 MMOL/L (ref 135–146)
TRIGL SERPL-MCNC: 270 MG/DL

## 2017-05-01 NOTE — TELEPHONE ENCOUNTER
Pharmacy is calling for refill  Last seen 4/24/17    Pending Prescriptions:                       Disp   Refills    canagliflozin (INVOKANA) 300 MG tablet    90 tab*0            Sig: Take 1 tablet (300 mg) by mouth every morning           (before breakfast)

## 2017-05-02 ENCOUNTER — MYC REFILL (OUTPATIENT)
Dept: FAMILY MEDICINE | Facility: CLINIC | Age: 47
End: 2017-05-02

## 2017-05-22 ENCOUNTER — MYC REFILL (OUTPATIENT)
Dept: FAMILY MEDICINE | Facility: CLINIC | Age: 47
End: 2017-05-22

## 2017-05-22 NOTE — TELEPHONE ENCOUNTER
Pt is requesting the following mediation.     Pending Prescriptions:                       Disp   Refills    dulaglutide (TRULICITY) 1.5 MG/0.5ML pen  6 mL   0            Sig: Inject 1.5 mg Subcutaneous every 7 days    Last O.V. Was:  4/20/17 - for Diabetes.   Pt is to return in 3 months.     Last Order for medication is: 1/20/17    Emiliana.DAVON Guzmán ([facility])

## 2017-05-22 NOTE — TELEPHONE ENCOUNTER
Message from MyChart:  Reg Kathleen would like a refill of the following medications:  dulaglutide (TRULICITY) 1.5 MG/0.5ML pen [Kaci Wilde MD]    Preferred pharmacy: [facility] - [place], [place] - [address]    Comment:

## 2017-05-23 ENCOUNTER — MYC REFILL (OUTPATIENT)
Dept: FAMILY MEDICINE | Facility: CLINIC | Age: 47
End: 2017-05-23

## 2017-05-24 NOTE — TELEPHONE ENCOUNTER
Message from MyChart:  Reg Kathleen would like a refill of the following medications:  dulaglutide (TRULICITY) 1.5 MG/0.5ML pen [Kaci Wilde MD]    Preferred pharmacy: [facility] - [place], [place] - [address]    Comment:  ran out , need a refill, thanksReg

## 2017-05-31 ENCOUNTER — TELEPHONE (OUTPATIENT)
Dept: FAMILY MEDICINE | Facility: CLINIC | Age: 47
End: 2017-05-31

## 2017-05-31 NOTE — TELEPHONE ENCOUNTER
Medical records request from [facility]. Records faxed 5/31/17

## 2017-06-01 ENCOUNTER — TRANSFERRED RECORDS (OUTPATIENT)
Dept: FAMILY MEDICINE | Facility: CLINIC | Age: 47
End: 2017-06-01

## 2017-06-20 ENCOUNTER — MYC REFILL (OUTPATIENT)
Dept: FAMILY MEDICINE | Facility: CLINIC | Age: 47
End: 2017-06-20

## 2017-06-20 DIAGNOSIS — K21.9 GASTROESOPHAGEAL REFLUX DISEASE WITHOUT ESOPHAGITIS: ICD-10-CM

## 2017-06-20 NOTE — TELEPHONE ENCOUNTER
Message from Glok[employer]hart:  Reg Kathleen would like a refill of the following medications:  blood glucose monitoring (ONE TOUCH TEST STRIPS) test strip [Kaci Wilde MD]  omeprazole (PRILOSEC) 20 MG capsule [Kaci Wilde MD]    Preferred pharmacy: [facility] - [place], [place] - [address]    Comment:

## 2017-06-20 NOTE — TELEPHONE ENCOUNTER
Reg Kathleen is requesting a refill of:    Pending Prescriptions:                       Disp   Refills    blood glucose monitoring (ONE TOUCH TEST *400 ea*3            Sig: Use to test blood sugar 4 times daily or as           directed. Has Verio IQ Meter.    omeprazole (PRILOSEC) 20 MG CR capsule    90 cap*3            Sig: Take 1 capsule (20 mg) by mouth daily    Please close encounter if RX was sent. Thanks, Sarah

## 2017-06-23 ENCOUNTER — MYC REFILL (OUTPATIENT)
Dept: FAMILY MEDICINE | Facility: CLINIC | Age: 47
End: 2017-06-23

## 2017-06-23 DIAGNOSIS — K21.9 GASTROESOPHAGEAL REFLUX DISEASE WITHOUT ESOPHAGITIS: ICD-10-CM

## 2017-06-23 NOTE — TELEPHONE ENCOUNTER
Message from rimidihart:  Reg Kathleen would like a refill of the following medications:  blood glucose monitoring (ONE TOUCH TEST STRIPS) test strip [Kaci Wilde MD]  omeprazole (PRILOSEC) 20 MG CR capsule [Kaci Wilde MD]    Preferred pharmacy: [facility] - [place], [place] - [address]    Comment:

## 2017-07-10 ENCOUNTER — TELEPHONE (OUTPATIENT)
Dept: SLEEP MEDICINE | Facility: CLINIC | Age: 47
End: 2017-07-10

## 2017-07-10 NOTE — TELEPHONE ENCOUNTER
SUBJECTIVE:  Pt had questions regarding replacement supplies.  He feels that he is a little more rested, however he was hoping to feel much better that he currently does.     OBJECTIVE:  Pt is meeting all objective benchmarks for usage, AHI and leak.     ASSESSMENT/PLAN:  Pt was given number for [facility] to receive replacement supplies.

## 2017-07-31 ENCOUNTER — MYC REFILL (OUTPATIENT)
Dept: FAMILY MEDICINE | Facility: CLINIC | Age: 47
End: 2017-07-31

## 2017-07-31 NOTE — TELEPHONE ENCOUNTER
Message from [email or web address]:  Reg Kathleen would like a refill of the following medications:  canagliflozin (INVOKANA) 300 MG tablet [Kaci Wilde MD]    Preferred pharmacy: [facility] # [id number] [facility] [address] [address]    Comment:

## 2017-08-18 ENCOUNTER — OFFICE VISIT (OUTPATIENT)
Dept: FAMILY MEDICINE | Facility: CLINIC | Age: 47
End: 2017-08-18

## 2017-08-18 ENCOUNTER — TRANSFERRED RECORDS (OUTPATIENT)
Dept: FAMILY MEDICINE | Facility: CLINIC | Age: 47
End: 2017-08-18

## 2017-08-18 ENCOUNTER — OFFICE VISIT (OUTPATIENT)
Dept: PHARMACY | Facility: PHYSICIAN GROUP | Age: 47
End: 2017-08-18
Payer: COMMERCIAL

## 2017-08-18 VITALS
SYSTOLIC BLOOD PRESSURE: 104 MMHG | HEART RATE: 74 BPM | WEIGHT: 255.4 LBS | TEMPERATURE: 98.8 F | HEIGHT: 70 IN | DIASTOLIC BLOOD PRESSURE: 72 MMHG | BODY MASS INDEX: 36.56 KG/M2

## 2017-08-18 DIAGNOSIS — E66.01 MORBID OBESITY DUE TO EXCESS CALORIES (H): ICD-10-CM

## 2017-08-18 LAB
ALBUMIN URINE MG/G CR: <30 MG/G CREATININE
ALBUMIN URINE MG/SPEC: 30
CREATININE URINE: 100
HBA1C MFR BLD: 7.8 % (ref 4–7)

## 2017-08-18 PROCEDURE — 99213 OFFICE O/P EST LOW 20 MIN: CPT | Performed by: FAMILY MEDICINE

## 2017-08-18 PROCEDURE — 83036 HEMOGLOBIN GLYCOSYLATED A1C: CPT | Performed by: FAMILY MEDICINE

## 2017-08-18 PROCEDURE — 36415 COLL VENOUS BLD VENIPUNCTURE: CPT | Performed by: FAMILY MEDICINE

## 2017-08-18 PROCEDURE — 82043 UR ALBUMIN QUANTITATIVE: CPT | Performed by: FAMILY MEDICINE

## 2017-08-18 PROCEDURE — 80048 BASIC METABOLIC PNL TOTAL CA: CPT | Mod: 90 | Performed by: FAMILY MEDICINE

## 2017-08-18 PROCEDURE — 99207 ZZC NO CHARGE LOS: CPT | Performed by: PHARMACIST

## 2017-08-18 RX ORDER — LOSARTAN POTASSIUM 25 MG/1
25 TABLET ORAL DAILY
Qty: 90 TABLET | Refills: 0 | Status: SHIPPED | OUTPATIENT
Start: 2017-08-18 | End: 2017-08-18

## 2017-08-18 RX ORDER — LOSARTAN POTASSIUM 25 MG/1
25 TABLET ORAL DAILY
Qty: 90 TABLET | Refills: 0 | Status: SHIPPED | OUTPATIENT
Start: 2017-08-18 | End: 2020-09-01

## 2017-08-18 RX ORDER — EZETIMIBE 10 MG/1
1 TABLET ORAL DAILY
COMMUNITY
Start: 2017-06-22

## 2017-08-18 NOTE — MR AVS SNAPSHOT
After Visit Summary   8/18/2017    Reg Kathleen    MRN: [id number]           Patient Information     Date Of Birth          1970        Visit Information        Provider Department      8/18/2017 8:45 AM Kaci Wilde MD [facility]        Today's Diagnoses     Uncontrolled type 2 diabetes mellitus without complication, with long-term current use of insulin (H)    -  1    Morbid obesity due to excess calories (H)           Follow-ups after your visit        Your next 10 appointments already scheduled     Apr 12, 2018  1:00 PM CDT   Return Sleep Patient with Bennett Ezra Goltz, PA-C   [facility] ([facility])    [address] [address] [phone]              Who to contact     If you have questions or need follow up information about today's clinic visit or your schedule please contact [facility] directly at [phone].  Normal or non-critical lab and imaging results will be communicated to you by SOLOhart, letter or phone within 4 business days after the clinic has received the results. If you do not hear from us within 7 days, please contact the clinic through [facility] or phone. If you have a critical or abnormal lab result, we will notify you by phone as soon as possible.  Submit refill requests through Excellence4u or call your pharmacy and they will forward the refill request to us. Please allow 3 business days for your refill to be completed.          Additional Information About Your Visit        SOLOhart Information     Excellence4u gives you secure access to your electronic health record. If you see a primary care provider, you can also send messages to your care team and make appointments. If you have questions, please call your primary care clinic.  If you do not have a primary care provider, please call [phone] and they will assist you.        Care EveryWhere  "ID     This is your Care EveryWhere ID. This could be used by other organizations to access your [facility] medical records  [id number]        Your Vitals Were     Pulse Temperature Height BMI (Body Mass Index)          74 98.8  F (37.1  C) (Oral) 1.772 m (5' 9.75\") 36.91 kg/m2         Blood Pressure from Last 3 Encounters:   08/18/17 104/72   04/20/17 120/80   04/13/17 141/81    Weight from Last 3 Encounters:   08/18/17 115.8 kg (255 lb 6.4 oz)   04/20/17 114.6 kg (252 lb 9.6 oz)   04/13/17 114.8 kg (253 lb)              We Performed the Following     Albumin Random Urine Quantitative     BASIC METABOLIC PANEL ([facility])     Hemoglobin A1c ([facility])     VENOUS COLLECTION          Today's Medication Changes          These changes are accurate as of: 8/18/17 11:59 PM.  If you have any questions, ask your nurse or doctor.               Start taking these medicines.        Dose/Directions    canagliflozin 300 MG tablet   Commonly known as:  INVOKANA   Used for:  Uncontrolled type 2 diabetes mellitus without complication, with long-term current use of insulin (H)   Started by:  Kaci Wilde MD        Dose:  300 mg   Take 1 tablet (300 mg) by mouth every morning (before breakfast)   Quantity:  90 tablet   Refills:  0       losartan 25 MG tablet   Commonly known as:  COZAAR   Used for:  Uncontrolled type 2 diabetes mellitus without complication, with long-term current use of insulin (H)   Started by:  Kaci Wilde MD        Dose:  25 mg   Take 1 tablet (25 mg) by mouth daily   Quantity:  90 tablet   Refills:  0       metFORMIN 1000 MG tablet   Commonly known as:  GLUCOPHAGE   Used for:  Uncontrolled type 2 diabetes mellitus without complication, with long-term current use of insulin (H)   Started by:  Kaci Wilde MD        Dose:  1000 mg   Take 1 tablet (1,000 mg) by mouth 2 times daily (with meals)   Quantity:  180 tablet   Refills:  0         These medicines have changed or have updated prescriptions.     "    Dose/Directions    insulin glargine 100 UNIT/ML injection   Commonly known as:  LANTUS SOLOSTAR   This may have changed:  additional instructions   Used for:  Uncontrolled type 2 diabetes mellitus without complication, with long-term current use of insulin (H)   Changed by:  Kcai Wilde MD        Inject 42 Units Subcutaneous twice daily   Quantity:  60 mL   Refills:  0       NovoLOG FLEXPEN 100 UNIT/ML injection   This may have changed:  additional instructions   Used for:  Uncontrolled insulin dependent diabetes mellitus (H)   Generic drug:  insulin aspart   Changed by:  Ree Newell RPH        Inject 18 units under the skin once with your dinner meal.   Quantity:  15 mL   Refills:  0            Where to get your medicines      These medications were sent to [facility] - [place], [place] - [address]  [address], [place] [address]     Phone:  [phone]     dulaglutide 1.5 MG/0.5ML pen    insulin glargine 100 UNIT/ML injection    losartan 25 MG tablet    metFORMIN 1000 MG tablet         These medications were sent to [id number] - [place], [place] - [address] [address] [facility]   [address] [address] [facility] [address]     Phone:  [phone]     canagliflozin 300 MG tablet                Primary Care Provider Office Phone # Fax #    Kaci Wilde [phone] [address] [address]        Equal Access to Services     [facility]: Hadii aad ku hadasho Soomaali, waaxda luqadaha, qaybta kaalmada adeegyada, janelle gutierrez . So [facility] [phone].    ATENCIÓN: Si habla español, tiene a chin disposición servicios gratuitos de asistencia lingüística. Onesimo al [phone].    We comply with applicable federal civil rights laws and [place] laws. We do not discriminate on the basis of race, color, national origin, age, disability sex, sexual orientation or gender identity.            Thank you!      Thank you for choosing [facility]  for your care. Our goal is always to provide you with excellent care. Hearing back from our patients is one way we can continue to improve our services. Please take a few minutes to complete the written survey that you may receive in the mail after your visit with us. Thank you!             Your Updated Medication List - Protect others around you: Learn how to safely use, store and throw away your medicines at www.disposemymeds.org.          This list is accurate as of: 8/18/17 11:59 PM.  Always use your most recent med list.                   Brand Name Dispense Instructions for use Diagnosis    ASPIRIN NOT PRESCRIBED    INTENTIONAL     continuous prn for other Antiplatelet medication not prescribed intentionally due to Not indicated based on age        blood glucose monitoring test strip    ONE TOUCH TEST STRIPS    400 each    Use to test blood sugar 4 times daily or as directed. Has Verio IQ Meter.    Uncontrolled type 2 diabetes mellitus without complication, with long-term current use of insulin (H)       canagliflozin 300 MG tablet    INVOKANA    90 tablet    Take 1 tablet (300 mg) by mouth every morning (before breakfast)    Uncontrolled type 2 diabetes mellitus without complication, with long-term current use of insulin (H)       dulaglutide 1.5 MG/0.5ML pen    TRULICITY    6 mL    Inject 1.5 mg Subcutaneous every 7 days    Uncontrolled type 2 diabetes mellitus without complication, with long-term current use of insulin (H)       ezetimibe 10 MG tablet    ZETIA     Take 1 tablet by mouth daily        insulin glargine 100 UNIT/ML injection    LANTUS SOLOSTAR    60 mL    Inject 42 Units Subcutaneous twice daily    Uncontrolled type 2 diabetes mellitus without complication, with long-term current use of insulin (H)       insulin pen needle 31G X 8 MM    B-D U/F    300 each    Use 2-3 daily as directed with Novolog and Lantus. Please delete other syringe  RX- needs pen needles.    Uncontrolled diabetes with kidney complications (H)       losartan 25 MG tablet    COZAAR    90 tablet    Take 1 tablet (25 mg) by mouth daily    Uncontrolled type 2 diabetes mellitus without complication, with long-term current use of insulin (H)       metFORMIN 1000 MG tablet    GLUCOPHAGE    180 tablet    Take 1 tablet (1,000 mg) by mouth 2 times daily (with meals)    Uncontrolled type 2 diabetes mellitus without complication, with long-term current use of insulin (H)       NovoLOG FLEXPEN 100 UNIT/ML injection   Generic drug:  insulin aspart     15 mL    Inject 18 units under the skin once with your dinner meal.    Uncontrolled insulin dependent diabetes mellitus (H)       omeprazole 20 MG CR capsule    priLOSEC    90 capsule    Take 1 capsule (20 mg) by mouth daily    Gastroesophageal reflux disease without esophagitis

## 2017-08-18 NOTE — PROGRESS NOTES
"SUBJECTIVE:  47 year old male presents for refills of his diabetis medications.    He was referred to Endocrinology for help managing his diabetis-Hemoglobin A1C was not at goal     Dr Valdes 's office note reviewed  Medications on consult note consistent with patient medication list after corrections    Current Outpatient Prescriptions   Medication     dulaglutide (TRULICITY) 1.5 MG/0.5ML pen     insulin glargine (LANTUS SOLOSTAR) 100 UNIT/ML injection     metFORMIN (GLUCOPHAGE) 1000 MG tablet     losartan (COZAAR) 25 MG tablet     canagliflozin (INVOKANA) 300 MG tablet     blood glucose monitoring (ONE TOUCH TEST STRIPS) test strip     omeprazole (PRILOSEC) 20 MG CR capsule     insulin pen needle (B-D U/F) 31G X 8 MM     ASPIRIN NOT PRESCRIBED, INTENTIONAL,     ezetimibe (ZETIA) 10 MG tablet     insulin aspart (NOVOLOG FLEXPEN) 100 UNIT/ML injection     No current facility-administered medications for this visit.          Started on zetia for hyperlipidemia with history of statin intolerance  Insulin adjusted  Otherwise medications unchanged    Reg has difficulty with long term commitment to lifestyle changes.  No longer exercising - previously going to club  Weight is up ten pounds-   Body mass index is 36.91 kg/(m^2).     OBJECTIVE:  /72 (BP Location: Right arm, Patient Position: Chair, Cuff Size: Adult Large)  Pulse 74  Temp 98.8  F (37.1  C) (Oral)  Ht 1.772 m (5' 9.75\")  Wt 115.8 kg (255 lb 6.4 oz)  BMI 36.91 kg/m2  Regular rate and  rhythm. S1 and S2 normal, no murmurs, clicks, gallops or rubs. No edema or JVD. Chest is clear; no wheezes or rales.    Assessment   Blood pressure at goal    Diabetis control: Hemoglobin A1C 7.8- above goal  Discussion of continuing care with Dr Valdes for diabetis management    Long term commitment to lifestyle changes    Medications refilled as he feels he will run out before his next visit with Dr Valdes.     [facility] will continue primary care - Dr Valdes " diabetis care- patient is in agreement with this plan.    More than 50% of visit spent in counseling.

## 2017-08-18 NOTE — PROGRESS NOTES
Clinical Consult:                                                    Reg Kathleen is a 47 year old male coming in for a clinical pharmacist consult.  He was referred to me from Dr. Wilde.     Reason for Consult: Has worked with MTM previously, but no longer in scope for MTM and has now established with endocrinology. He scheduled with MTM and PCP today.    Discussion: Pt is now working with Endocrinology- do not have most recent record today before our visit, but we do have the initial June consult report.     Pt reports that he is currently taking Metformin 1000mg BID, Lantus 42 BID (suppose to increase to 38 BID), Trulicity 1.5mg weekly and Invokana 300mg once daily and using Novolog 16 units with dinner now (previously dosing post meal sporadically). Pt is not experiencing side effects.      He is convinced that the increased insulin makes his sugars higher, but has not been following through with positive lifestyle changes.    Feels he eats more when his doses are higher on the Lantus so he finds it is not helpful to increase. Eating 3 meals a day, but states his breakfast and lunch are pretty small. Does snack after dinner and usually drinking several alcoholic drinks most nights of the week- has not been clear with his timing of drinking in relationship to his blood sugar readings.   Having hypoglycemia? No, but does get feelings of lows midday if he is exercising a lot (never has been <80mg/dl though, usually feels low when close to 100mg/dl)   Symptoms of hyperglycemia? none  ACEi/ARB: Yes- losartan prescribed, but patient is not regularly taking it because he thinks he gets dizzy or a cough or other side effects - Microalbumin is < 30 mg/g. Reinforced the importance of this today.   Aspirin: Not taking due to age  Statin: no longer on statin due to intolerable side effects, most recently tried Crestor 5mg but was not willing to take regularly due to muscle concerns. Endocrinology switched him to  Zetia now.     Plan:  1. Encouraged follow up with endocrinology for ongoing management.  2. MTM will update med list when July endo report is received- done, pt was told to increase to Lantus 41 units BID and Novolog 18 units daily with dinner, pt is taking 42 units Lantus BID.     Ree Newell, Pharm.D, [facility]  Medication Therapy Management Pharmacist  [phone]

## 2017-08-18 NOTE — PATIENT INSTRUCTIONS
Diabetes Goals:    Home Monitoring of Blood Sugars:Fasting  mg/dL and 2 hours after a meal less than 150 mg/dL.    Hemoglobin A1C: Less than 7%. Yours is   Lab Results   Component Value Date    A1C 7.5 04/20/2017   .    Blood Pressure: Less than 140/90mmHg. Yours is There were no vitals taken for this visit..    Cholesterol: You are taking rosuvastatin to help decrease the risk of heart disease.    Things you can do to help lower your blood sugars:    Diet: 3 meals and 1-2 snacks per day with 60 grams of carbohydrates per meal and 15-30 grams of carbohydrates per snack. Try to fill your plate at least half-full with vegetables, fill one-quarter full with lean meats or protein, and also make sure you get at least some carbohydrate with every meal.    Exercise: 30 minutes per day of anything that will increase your heart rate and make you break a sweat! Gardening, walking, cleaning the house, changing the oil in your car, etc. If you feel like 30 minutes per day is too much, start small. Even lifting canned foods or working your arms with a resistance band in front of the TV can help.

## 2017-08-19 LAB
BUN SERPL-MCNC: 16 MG/DL (ref 7–25)
BUN/CREATININE RATIO: ABNORMAL (CALC) (ref 6–22)
CALCIUM SERPL-MCNC: 9.9 MG/DL (ref 8.6–10.3)
CHLORIDE SERPLBLD-SCNC: 101 MMOL/L (ref 98–110)
CO2 SERPL-SCNC: 26 MMOL/L (ref 20–31)
CREAT SERPL-MCNC: 1.2 MG/DL (ref 0.6–1.35)
EGFR AFRICAN AMERICAN - QUEST: 84 ML/MIN/1.73M2
GFR SERPL CREATININE-BSD FRML MDRD: 72 ML/MIN/1.73M2
GLUCOSE - QUEST: 175 MG/DL (ref 65–99)
POTASSIUM SERPL-SCNC: 4.5 MMOL/L (ref 3.5–5.3)
SODIUM SERPL-SCNC: 138 MMOL/L (ref 135–146)

## 2017-08-27 PROBLEM — E66.01 MORBID OBESITY DUE TO EXCESS CALORIES (H): Status: ACTIVE | Noted: 2017-01-12

## 2017-11-01 ENCOUNTER — TRANSFERRED RECORDS (OUTPATIENT)
Dept: FAMILY MEDICINE | Facility: CLINIC | Age: 47
End: 2017-11-01

## 2017-11-03 ENCOUNTER — DOCUMENTATION ONLY (OUTPATIENT)
Dept: PHARMACY | Facility: PHYSICIAN GROUP | Age: 47
End: 2017-11-03

## 2017-11-03 NOTE — PROGRESS NOTES
Clinical Consult:                                                    Reg Kathleen is a 47 year old male called for a clinical pharmacist consult.  He was referred to me from self.     Reason for Consult: Pt is currently out of scope for MTM- he is looking for pro/cons comparison of the different SGLT2 inhibitors.     Discussion: He is currently taking Invokana - seeing Dr. Valdes with Endocrinology and was told to consider an alternative SGTL2 inhibitor due to the potential bone risks with Invokana.     All SGLT2 inhibitors have the following mutual risks:  -increased rates of UTI  -increased risk of ketoacidosis (rare)  -increased risk of yeast infections  -possible renal function changes  -possible increased potassium  -increased risk of fractures (rare)     Agent specific risks/benefits-    Invokana Risk- decrease in bone mineral density seen (in addition to increase fracture risk) and also increased rates of amputation (6 per 1000 patients compared to 3 in 1000 for standard treatment).  Invokana Benefit- slightly lower risk of a combined endpoint of death from CV causes, MI or non-fatal stroke (27 per 1000 patients treated).  Farxiga Risk- possible increase in bladder cancer ( study)  Farxiga Benefit- no CV outcome data yet  Jardiance Risk- nothing unique to this medication (just what is listed above in class risks)  Jardiance Benefit- Decrease rates of CV death and decreased rates of hospitalization due to HF.    PLAN:  1. Reviewed this information with patient- he reports Farxiga is preferred and Jardiance requires PA. He will follow up with endocrinology.     Ree Newell, Pharm.D, [facility]  Medication Therapy Management Pharmacist  [phone]

## 2017-11-15 ENCOUNTER — TRANSFERRED RECORDS (OUTPATIENT)
Dept: FAMILY MEDICINE | Facility: CLINIC | Age: 47
End: 2017-11-15

## 2017-12-11 RX ORDER — INSULIN ASPART 100 [IU]/ML
INJECTION, SOLUTION INTRAVENOUS; SUBCUTANEOUS
Refills: 0 | OUTPATIENT
Start: 2017-12-11

## 2017-12-11 RX ORDER — DULAGLUTIDE 1.5 MG/.5ML
INJECTION, SOLUTION SUBCUTANEOUS
OUTPATIENT
Start: 2017-12-11

## 2017-12-15 ENCOUNTER — MYC REFILL (OUTPATIENT)
Dept: FAMILY MEDICINE | Facility: CLINIC | Age: 47
End: 2017-12-15

## 2017-12-16 NOTE — TELEPHONE ENCOUNTER
Per his 8/8/17 visit was to have Endo take over medications. Please see his documentation from Pt    Thanks,Sarah Kathleen is requesting a refill of:    Pending Prescriptions:                       Disp   Refills    dulaglutide (TRULICITY) 1.5 MG/0.5ML pen  6 mL   0            Sig: Inject 1.5 mg Subcutaneous every 7 days

## 2017-12-16 NOTE — TELEPHONE ENCOUNTER
Message from NetMindert:  Cely Kathleen would like a refill of the following medications:  dulaglutide (TRULICITY) 1.5 MG/0.5ML pen [Kaci Wilde MD]    Preferred pharmacy: [facility] - [place], [place] - [address]    Comment:  I'm trying to change my prescriptions over to Ke[facility], but he's out and his assistant doesn't know about the prescription. I've been off it for two weeks now and the blood sugars are up, would you write a renew the prescription for 3 more months until I get this figured out with the new clinic? Below is the email I got back, CELY FRANKLIN DID NOT PRESCRIBE TRULICITY AND THERE IS NOTHING IN HIS NOTES, [facility] WILL HAVE TO HAVE THAT FILLED BY THE  WHO DID PRESCRIBE IT. THANK YOU

## 2017-12-21 NOTE — TELEPHONE ENCOUNTER
Reg Kathleen is requesting a refill of:    Pending Prescriptions:                       Disp   Refills    canagliflozin (INVOKANA) 300 MG tablet    90 tab*0            Sig: Take 1 tablet (300 mg) by mouth every morning           (before breakfast)    Please close encounter if RX was sent. Thanks, Sarah

## 2018-02-07 ENCOUNTER — TRANSFERRED RECORDS (OUTPATIENT)
Dept: FAMILY MEDICINE | Facility: CLINIC | Age: 48
End: 2018-02-07

## 2018-02-08 LAB
CHOL/HDLC RATIO - QUEST: 3.6
CHOLEST SERPL-MCNC: 185 MG/DL
GLUCOSE SERPL-MCNC: 120 MG/DL (ref 70–99)
HBA1C MFR BLD: 7.7 % (ref 4–7)
HDLC SERPL-MCNC: 51 MG/DL
LDLC SERPL CALC-MCNC: 97 MG/DL
NONHDLC SERPL-MCNC: 134 MG/DL
TRIGL SERPL-MCNC: 256 MG/DL
TSH SERPL-ACNC: 6.25 MCU/ML (ref 0.3–5)

## 2018-04-03 RX ORDER — DULAGLUTIDE 1.5 MG/.5ML
INJECTION, SOLUTION SUBCUTANEOUS
OUTPATIENT
Start: 2018-04-03

## 2018-04-03 NOTE — TELEPHONE ENCOUNTER
Pending Prescriptions:                       Disp   Refills    TRULICITY 1.5 MG/0.5ML pen [Pharmacy Med *                    Sig: INJECT 1.5MG SUBCUTANEOUSLY EVERY 7 DAYS    Dr. ROMERO please review;    Are we prescribing this medication or Dr. Hinton SEE RE on 12-  Pt last ov was 8- to f/u with Dr. Valdes  Please fax or alcira Palm  [phone] (home) [phone] (work)

## 2018-04-17 RX ORDER — DULAGLUTIDE 1.5 MG/.5ML
INJECTION, SOLUTION SUBCUTANEOUS
OUTPATIENT
Start: 2018-04-17

## 2018-04-17 NOTE — TELEPHONE ENCOUNTER
Refused Prescriptions:                       Disp   Refills    TRULICITY 1.5 MG/0.5ML pen [Pharmacy Med N*                Sig: INJECT 1.5MG SUBCUTANEOUSLY EVERY 7 DAYS  Refused By: AIRAM SCHAEFER  Reason for Refusal: Originating/Specialty Provider to approve  Reason for Refusal Comment: Felicity Hinton    See RE on 4-3-2018  Arpita  [phone] (home) [phone] (work)

## 2018-04-26 ENCOUNTER — TELEPHONE (OUTPATIENT)
Dept: FAMILY MEDICINE | Facility: CLINIC | Age: 48
End: 2018-04-26

## 2018-04-26 NOTE — TELEPHONE ENCOUNTER
Patient needs BJS to fill out and sign [place] [facility] Diabetic  Form, please fax when complete and mail original back to patient home address.     Pt can be reached at home number if any questions, concerns.     Routing to Carie

## 2018-06-15 DIAGNOSIS — K21.9 GASTROESOPHAGEAL REFLUX DISEASE WITHOUT ESOPHAGITIS: ICD-10-CM

## 2018-06-15 NOTE — TELEPHONE ENCOUNTER
Patient called in asking about what requests we are getting from the mail order pharmacy. I called him back to inform him that the most recent request was just for omeprazole and he was informed that a 30 day supply was sent to [facility] for him from Dr. Wilde until he can get his refill from Dr. Rosen. He is going to call the mail order pharmacy to inform them to send the requests to Dr. Lala office instead.

## 2018-06-15 NOTE — TELEPHONE ENCOUNTER
Pending Prescriptions:                       Disp   Refills    omeprazole (PRILOSEC) 20 MG CR capsule    30 cap*0            Sig: Take 1 capsule (20 mg) by mouth daily    BJS please review:    I denied the mail order refill for this medication.  Pt has not been seen since 8-2017  bp last refill was 90 days 8-8-2017 and should of been out in November 2017  Is Dr. Valdes taking over?  Please fax 30 send to [facility] if pt is due for ov, let them know if pt should be fasting or not  Arpita  [phone] (home) [phone] (work)

## 2018-06-15 NOTE — TELEPHONE ENCOUNTER
Refused Prescriptions:                       Disp   Refills    omeprazole (PRILOSEC) 20 MG CR capsule [Ph*90 cap*2        Sig: TAKE 1 CAPSULE BY MOUTH DAILY  Refused By: AIRAM SCHAEFER  Reason for Refusal: Patient needs appointment    Mail order denied  Last ov 5-2017  Pt due for yearly ov  Eves  [phone] (home) [phone] (work)

## 2018-06-15 NOTE — TELEPHONE ENCOUNTER
Elva Nance  Omeprazole 30 days  Pt is due for a FASTING OV  Eves  [phone] (home) [phone] (work)

## 2018-12-13 DIAGNOSIS — G47.33 OSA (OBSTRUCTIVE SLEEP APNEA): Primary | ICD-10-CM

## 2020-03-02 ENCOUNTER — HEALTH MAINTENANCE LETTER (OUTPATIENT)
Age: 50
End: 2020-03-02

## 2020-09-01 RX ORDER — DAPAGLIFLOZIN 5 MG/1
10 TABLET, FILM COATED ORAL DAILY
COMMUNITY

## 2020-09-01 NOTE — PROGRESS NOTES
"Reg Kathleen is a 50 year old male who is being evaluated via a billable video visit.      The patient has been notified of following:     \"This video visit will be conducted via a call between you and your physician/provider. We have found that certain health care needs can be provided without the need for an in-person physical exam.  This service lets us provide the care you need with a video conversation.  If a prescription is necessary we can send it directly to your pharmacy.  If lab work is needed we can place an order for that and you can then stop by our lab to have the test done at a later time.    Video visits are billed at different rates depending on your insurance coverage.  Please reach out to your insurance provider with any questions.    If during the course of the call the physician/provider feels a video visit is not appropriate, you will not be charged for this service.\"    Patient has given verbal consent for Video visit? No  How would you like to obtain your AVS? MyChart, MAIL  If you are dropped from the video visit, the video invite should be resent to: Send to e-mail at: [email or web address]  Will anyone else be joining your video visit? No        Sleep Consultation:    Date on this visit: 9/2/2020    Reg Kathleen is sent by No ref. provider found for a sleep consultation regarding GILDA.    Primary Physician: Kaci Wilde     Reg Kathleen presents for continued management of mild positional GILDA. He was initially seen at the [facility] for loud snoring and feeling unrefreshed from sleep. He wakes feeling out of breath and has a family history of sleep apnea. These symptoms have been occurring for almost 2 decades. His medical history is significant for DM type 2, GERD and TMJ disorder. He has 3 siblings with GILDA.     HST results (1/19/2017): Wt 246#   AHI: 14.6/hr VALENT[place]: 25.5/hr Supine AHI: 46.9/hr Lateral AHI: 5.5/hr on left, 4/hr on right and " 7.3/hr prone   Average SpO2: 91% Lowest Desaturation: 77%. Time Spent Below 89%: 41.3 minutes    He was last seen by me in 4/2017. He is on auto CPAP 5-15 cm. He primarily presents to get new supplies, which he has not replaced in years. He says it works wonderfully. He says he regrets it if he does not use it. He uses the AirFit N20 mask. He denies snoring with it. He has dry mouth day and night from medications. It is sometimes improved with CPAP. His mask is falling apart, so has noticed some leak lately. Normally it fits well. He denies skin irritation aside from a little indent in his skin in the morning. He sometimes feels the pressure starts too low, especially if he gets up in the middle of the night.       The compliance data shows that the patient used the CPAP 85% of nights for >4 hours.  The 90th% pressure is 8.6 cm.  The average time in large leak is 0.  The average nightly usage is 427 min.  The average AHI is 0.8/hr.      Reg goes to sleep at 12:00 AM (+/- 2 hours) during the week. He often falls asleep watching TV before bedtime.  He wakes up at 8:45-9:00 AM sometimes with an alarm. He is almost always up before his alarm, often around 6:30-7:00 AM. He falls asleep in 15 minutes or less.  Reg denies difficulty falling asleep.  He wakes up 1-4 times a night for 10 minutes to hours before falling back to sleep.  Reg wakes up to go to the bathroom and uncertain reasons. Sometimes he wakes at 2:30 AM and can't get back to sleep. He worries about the pandemic and rioting by his friends. Prior to that, he used to wake to worry about work.  On weekends, his sleep schedule is about the same, but he may try to sleep in more.  Patient gets an average of 6-7 hours of sleep per night.     Patient does not use electronics in bed, watch TV in bed and read in bed.     Reg does not do shift work.  He works day shifts. He works in computer programming. He lives with his wife.     Reg does have a regular bed  partner. They never sleep separately.  Patient sleeps on his back and side. He has occasional restless legs (has not been exercising since COVID), denies morning headaches and morning confusion. Reg denies any bruxism, sleep walking, sleep talking, dream enactment, sleep paralysis, cataplexy and hypnogogic/hypnopompic hallucinations.    Reg has difficulty breathing through his nose, reflux at night and heartburn, denies claustrophobia.      Reg has kept a consistent weight since his sleep study.  Patient describes themself as more of a night person.He would prefer to go to sleep at 12:00 AM and wake up at 7:00 AM.  Patient's [place] Sleepiness score 14/24 consistent with moderate daytime sleepiness.  He feels tired, but is busy enough that he does not fall asleep unless he is watching TV.    Reg rarely takes naps, only if he could not sleep the night before.  He denies dozing while driving.  Patient was counseled on the importance of driving while alert, to pull over if drowsy, or nap before getting into the vehicle if sleepy.  He uses 2-4 cups/day of coffee. Last caffeine intake is usually before 2 PM.    Allergies:    Allergies   Allergen Reactions     Amoxicillin Hives     Penicillins Hives     Septra [Sulfamethoxazole W-Trimethoprim] Hives       Medications:    Current Outpatient Medications   Medication Sig Dispense Refill     ASPIRIN NOT PRESCRIBED, INTENTIONAL, continuous prn for other Antiplatelet medication not prescribed intentionally due to Not indicated based on age       blood glucose monitoring (ONE TOUCH TEST STRIPS) test strip Use to test blood sugar 4 times daily or as directed. Has Verio IQ Meter. 400 each 3     dapagliflozin (FARXIGA) 5 MG TABS tablet Take 10 mg by mouth daily 25mg every day       dulaglutide (TRULICITY) 1.5 MG/0.5ML pen Inject 1.5 mg Subcutaneous every 7 days 6 mL 0     ezetimibe (ZETIA) 10 MG tablet Take 1 tablet by mouth daily       insulin aspart (NOVOLOG FLEXPEN) 100  UNIT/ML injection Inject 70 units under the skin once with your dinner meal. 15 mL 0     insulin glargine (LANTUS SOLOSTAR) 100 UNIT/ML injection Inject 42 Units Subcutaneous twice daily (Patient taking differently: Inject 50 Units Subcutaneous twice daily) 60 mL 0     insulin pen needle (B-D U/F) 31G X 8 MM Use 2-3 daily as directed with Novolog and Lantus. Please delete other syringe RX- needs pen needles. 300 each PRN     metFORMIN (GLUCOPHAGE) 1000 MG tablet Take 1 tablet (1,000 mg) by mouth 2 times daily (with meals) 180 tablet 0     omeprazole (PRILOSEC) 20 MG CR capsule Take 1 capsule (20 mg) by mouth daily 30 capsule 0       Problem List:  Patient Active Problem List    Diagnosis Date Noted     GILDA (obstructive sleep apnea) 01/24/2017     Priority: Medium     Morbid obesity due to excess calories (H) 01/12/2017     Priority: Medium     Uncontrolled diabetes with kidney complications (H) 12/02/2015     Priority: Medium     ACP (advance care planning) 09/04/2015     Priority: Medium     Advance Care Planning 9/4/2015: ACP Review and Resources Provided:  Reviewed chart for advance care plan.  Reg Kathleen has no plan or code status on file. Discussed available resources and provided with information. Confirmed code status reflects current choices pending further ACP discussions.  Confirmed/documented legally designated decision maker(s). Added by KENISHA THEODORE             Esophageal reflux 12/11/2013     Priority: Medium     Health Care Home 12/19/2012     Priority: Medium     State Tier Level:  0  Status:  n/a  Care Coordinator:     See Letters for [facility] Care Plan              Past Medical/Surgical History:  No past medical history on file.  Past Surgical History:   Procedure Laterality Date     C NONSPECIFIC PROCEDURE      leg injury age 22/ fasciotomy??     DENTAL SURGERY      four wisdom teeth       Social History:  Social History     Socioeconomic History     Marital status:      Spouse  name: Not on file     Number of children: Not on file     Years of education: Not on file     Highest education level: Not on file   Occupational History     Not on file   Social Needs     Financial resource strain: Not on file     Food insecurity     Worry: Not on file     Inability: Not on file     Transportation needs     Medical: Not on file     Non-medical: Not on file   Tobacco Use     Smoking status: Former Smoker     Packs/day: 0.10     Years: 10.00     Pack years: 1.00     Types: Cigarettes     Last attempt to quit: 1998     Years since quittin.6     Smokeless tobacco: Never Used     Tobacco comment: 3 cigarettes per day, smoked socially   Substance and Sexual Activity     Alcohol use: Yes     Alcohol/week: 6.0 standard drinks     Types: 6 Standard drinks or equivalent per week     Comment: 4 days per week, 3 drinks, more on weekends     Drug use: No     Sexual activity: Not on file   Lifestyle     Physical activity     Days per week: Not on file     Minutes per session: Not on file     Stress: Not on file   Relationships     Social connections     Talks on phone: Not on file     Gets together: Not on file     Attends Oriental orthodox service: Not on file     Active member of club or organization: Not on file     Attends meetings of clubs or organizations: Not on file     Relationship status: Not on file     Intimate partner violence     Fear of current or ex partner: Not on file     Emotionally abused: Not on file     Physically abused: Not on file     Forced sexual activity: Not on file   Other Topics Concern     Not on file   Social History Narrative     Not on file       Family History:  Family History   Problem Relation Age of Onset     Cancer Mother         lymphoma     Diabetes Mother          age 83     Diabetes Father          age 73     Sleep Apnea Sister         2 sisters and 1 brother     Coronary Artery Disease No family hx of        Review of Systems:  A complete review of systems  "reviewed by me is negative with the exeption of what has been mentioned in the history of present illness.  CONSTITUTIONAL: NEGATIVE for weight gain/loss, fever, chills, sweats or night sweats, drug allergies.  EYES: NEGATIVE for blind spots, double vision.  EYES:  POSITIVE for changes in vision- with changes in blood sugar  ENT: NEGATIVE for ear pain, sore throat, sinus pain, post-nasal drip, runny nose, bloody nose  ENT:  POSITIVE for  congestion  CARDIAC: NEGATIVE for fast heartbeats or fluttering in chest, chest pain or pressure, breathlessness when lying flat, swollen legs.  CARDIAC:  POSITIVE for  swollen feet  NEUROLOGIC: NEGATIVE headaches, weakness or numbness in the arms or legs.  DERMATOLOGIC: NEGATIVE for rashes, new moles or change in mole(s)  PULMONARY: NEGATIVE SOB at rest, SOB with activity, dry cough, productive cough, coughing up blood, wheezing or whistling when breathing.    GASTROINTESTINAL: NEGATIVE for nausea or vomitting, loose or watery stools, fat or grease in stools, constipation, abdominal pain, bowel movements black in color or blood noted.  GENITOURINARY: NEGATIVE for pain during urination, blood in urine, irregular menstrual periods.  GENITOURINARY:  POSITIVE for  urinating more frequently than usual  MUSCULOSKELETAL: NEGATIVE for muscle pain, bone pain, swollen joints.  MUSCULOSKELETAL:  POSITIVE for  Arthritis in hands  ENDOCRINE: NEGATIVE for increased thirst.  ENDOCRINE:  POSITIVE for  diabetes  LYMPHATIC: NEGATIVE for swollen lymph nodes, lumps or bumps in the breasts or nipple discharge.  MENTAL HEALTH: POSITIVE for anxiety    Physical Examination:  Vitals: Ht 1.762 m (5' 9.37\")   Wt 113.4 kg (250 lb)   BMI 36.53 kg/m           [place] Total Score 4/13/2017   Total score - [place] 14       PRUDENCIO Total Score: 14 (09/01/20 1400)    GENERAL APPEARANCE: healthy, alert, no distress and cooperative, verbose  EYES: Eyes grossly normal to inspection and wearing glasses  HENT: oropharynx " crowded and tongue base enlarged  NECK: no asymmetry, masses, or scars  RESP: no respiratory distress, cough or wheeze  Mallampati Class: IV.  Tonsillar Stage: not visualized.    Impression/Plan:    (G47.33) GILDA (obstructive sleep apnea)  (primary encounter diagnosis)  Comment: Reg Vines was diagnosed with moderate, positional GILDA about 3.5 years ago. He has been doing well with CPAP and presents to get a prescription for new supplies. He feels the pressure could start a little higher. Otherwise, his CPAP seems to be working subjectively and objectively. He states he is tired, but not enough to fall asleep. His sleep schedule can vary quite a bit. His sleep is sometimes disrupted by worrying about various things. He also has not been exercising much lately. His weight is up about 4 pounds since his study.  Plan: Comprehensive DME        Order placed to change pressures to 7-15 cm and for new supplies. We talked about consistency of his sleep schedule 12-7 AM.    (G25.81) Restless legs syndrome (RLS)  Comment: Symptoms have been worse, which he attributes to not exercising as much since the pandemic.   Plan: Ferritin         We discussed that RLS symptoms can be more pronounced with late caffeine, alcohol, antihistamines, and being over or under-active.        He will follow up with me in approximately two years.       Polysomnography reviewed.  Obstructive sleep apnea reviewed.  Complications of untreated sleep apnea were reviewed.    58 minutes was spent during this visit, over 50% in counseling and coordination of care.   Bennett Goltz, PA-C     CC: No ref. provider found      Video-Visit Details    Type of service:  Video Visit    Video Start Time: 1:03 PM  Video End Time: 2:01 PM    Originating Location (pt. Location): Home    Distant Location (provider location):  [facility]     Platform used for Video Visit: AmWell Bennett Ezra Goltz, PA-C

## 2020-09-02 ENCOUNTER — VIRTUAL VISIT (OUTPATIENT)
Dept: SLEEP MEDICINE | Facility: CLINIC | Age: 50
End: 2020-09-02
Payer: COMMERCIAL

## 2020-09-02 VITALS — BODY MASS INDEX: 37.03 KG/M2 | WEIGHT: 250 LBS | HEIGHT: 69 IN

## 2020-09-02 DIAGNOSIS — G25.81 RESTLESS LEGS SYNDROME (RLS): ICD-10-CM

## 2020-09-02 DIAGNOSIS — G47.33 OSA (OBSTRUCTIVE SLEEP APNEA): Primary | ICD-10-CM

## 2020-09-02 PROCEDURE — 99204 OFFICE O/P NEW MOD 45 MIN: CPT | Mod: 95 | Performed by: PHYSICIAN ASSISTANT

## 2020-09-02 ASSESSMENT — MIFFLIN-ST. JEOR: SCORE: 1995.24

## 2020-12-14 ENCOUNTER — HEALTH MAINTENANCE LETTER (OUTPATIENT)
Age: 50
End: 2020-12-14

## 2021-04-18 ENCOUNTER — HEALTH MAINTENANCE LETTER (OUTPATIENT)
Age: 51
End: 2021-04-18

## 2021-08-07 ENCOUNTER — HEALTH MAINTENANCE LETTER (OUTPATIENT)
Age: 51
End: 2021-08-07

## 2021-10-02 ENCOUNTER — HEALTH MAINTENANCE LETTER (OUTPATIENT)
Age: 51
End: 2021-10-02

## 2021-10-06 DIAGNOSIS — G47.33 OBSTRUCTIVE SLEEP APNEA: Primary | ICD-10-CM

## 2022-03-19 ENCOUNTER — HEALTH MAINTENANCE LETTER (OUTPATIENT)
Age: 52
End: 2022-03-19

## 2022-05-14 ENCOUNTER — HEALTH MAINTENANCE LETTER (OUTPATIENT)
Age: 52
End: 2022-05-14

## 2022-09-03 ENCOUNTER — HEALTH MAINTENANCE LETTER (OUTPATIENT)
Age: 52
End: 2022-09-03

## 2023-06-03 ENCOUNTER — HEALTH MAINTENANCE LETTER (OUTPATIENT)
Age: 53
End: 2023-06-03

## 2023-11-02 NOTE — PROGRESS NOTES
CPAP Follow-Up Visit:    Date on this visit: 11/3/2023    Reg Kathleen has a follow-up visit today to review his CPAP use for GILDA. He was initially seen for loud snoring and feeling unrefreshed from sleep. He wakes feeling out of breath and has a family history of sleep apnea. These symptoms have been occurring for almost 2 decades. His medical history is significant for DM type 2, GERD and TMJ disorder. He has 3 siblings with GILDA.      Previous Study Results:   HST results (1/19/2017): Wt 246#   AHI: 14.6/hr VALENTINA: 25.5/hr Supine AHI: 46.9/hr Lateral AHI: 5.5/hr on left, 4/hr on right and 7.3/hr prone   Average SpO2: 91% Lowest Desaturation: 77%. Time Spent Below 89%: 41.3 minutes        Respironics (2/23/17), he did get a machine from the recall. Dreamstation 2  Auto-PAP 7.0 - 15.0 cmH2O 30 day usage data:    83% of days with > 4 hours of use. 0/30 days with no use.   Average use 385 minutes per day.   Average leak 21.82 LPM.  Average % of night in large leak 0%.    CPAP 90% pressure 7.5cm.   AHI 0.7 events per hour.    The buttons on Reg's CPAP are not as responsive. He is interested in fixing or replacing the machine.        No specialty comments available.    Do you use a CPAP Machine at home: Yes  Overall, on a scale of 0-10 how would you rate your CPAP (0 poor, 10 great): 7    What type of mask do you use: Nasal Mask AirFit N20  Is your mask comfortable: Yes  If not, why:    How often do you replace supplies: cushion every couple of months. He rotates between 3 or 4 hoses. He has not been changing the filters often. He needed to have this visit to get more.     Is your mask leaking: No  If yes, where do you feel it:    How many night per week does the mask leak (0-7):      Do you notice snoring with mask on: No  Do you notice gasping arousals with mask on: No  Are you having significant oral or nasal dryness: Yes may be medication related. He feels the mask allows him to keep his mouth shut.   Are  you using the humidifier: yes  Does the water chamber run out before the night is over:not completely  Do you get condensation in the mask or hose:only in the summer if he does not reduce the humidity setting  Is the pressure setting comfortable: Yes  If not, why:      Typical bedtime: 12:00  Sleep latency on PAP therapy: 10 minutes  Typical wake time: 7-8  Wakes 3 times per night he may wake between 4 and 5:30 AM and has difficulty getting back to sleep for 60 minutes. That is less likely if he exercises. He often starts thinking about work. [place] has to get up earlier. Reason for waking: restroom  How many hours on average per night are you using PAP therapy: check the logs  How many hours are you sleeping per night: 5-8  Do you feel well rested in the morning: No    Naps: if he has a prolonged awakening for 30 minutes over lunch break. Sometimes it can be 3-4 hours. He does not find that causes more difficulty sleeping that night.          Weight change since sleep study: 250 lbs      Past medical/surgical history, family history, social history, medications and allergies were reviewed.      Problem List:  Patient Active Problem List    Diagnosis Date Noted    GILDA (obstructive sleep apnea) 01/24/2017     Priority: Medium    Morbid obesity due to excess calories (H) 01/12/2017     Priority: Medium    Uncontrolled diabetes with kidney complications 12/02/2015     Priority: Medium    ACP (advance care planning) 09/04/2015     Priority: Medium     Advance Care Planning 9/4/2015: ACP Review and Resources Provided:  Reviewed chart for advance care plan.  Reg Kathleen has no plan or code status on file. Discussed available resources and provided with information. Confirmed code status reflects current choices pending further ACP discussions.  Confirmed/documented legally designated decision maker(s). Added by KENISHA THEODORE             "Esophageal reflux 12/11/2013     Priority: Medium    Health Care Home 12/19/2012     Priority: Medium     State Tier Level:  0  Status:  n/a  Care Coordinator:     See Letters for [facility] Care Plan              Impression/Plan:    (G47.33) GILDA (obstructive sleep apnea)  (primary encounter diagnosis)  Comment: Reg is using CPAP regularly and benefits from use. His apnea is well-controlled. The buttons on his machine are not as responsive as he would like and he is interested in fixing or replacing the machine. He is due for a new machine.  Plan: Comprehensive DME        Order placed for a replacement auto CPAP 7-15 cm.  A prescription was written for new supplies. We reviewed recommendations for cleaning and replacing supplies.       (G47.00) Insomnia, unspecified type  Comment:  early AM awakenings, may have some work stress. Exercise helps. He naps when he has the awakenings.  Plan:  Try to get regular exercise as that seems to help the most. We discussed stimulus control and sleep compression. He was advised to start with about 7.5 hours in bed and reduce by 15 minutes every couple of weeks until sleeping through the night more consistently. Then expand again by 15 minutes every couple of weeks to find the optimal amount of time in bed. He was advised to avoid sleeping in or napping. We discussed dealing with stress by setting aside a designated \"worry time\" in the early evening and s/he was given resources for guided imagery/relaxation.      He will follow up with me in about 2 year(s).     33 minutes were spent on the date of the encounter doing chart review, history and exam, documentation and further activities as noted above.     Bennett Goltz, PA-C    CC: No ref. provider found          "

## 2023-11-03 ENCOUNTER — VIRTUAL VISIT (OUTPATIENT)
Dept: SLEEP MEDICINE | Facility: CLINIC | Age: 53
End: 2023-11-03
Payer: COMMERCIAL

## 2023-11-03 VITALS — HEIGHT: 70 IN | WEIGHT: 250 LBS | BODY MASS INDEX: 35.79 KG/M2

## 2023-11-03 DIAGNOSIS — G47.33 OSA (OBSTRUCTIVE SLEEP APNEA): Primary | ICD-10-CM

## 2023-11-03 DIAGNOSIS — G47.00 INSOMNIA, UNSPECIFIED TYPE: ICD-10-CM

## 2023-11-03 PROCEDURE — 99204 OFFICE O/P NEW MOD 45 MIN: CPT | Mod: VID | Performed by: PHYSICIAN ASSISTANT

## 2023-11-03 ASSESSMENT — SLEEP AND FATIGUE QUESTIONNAIRES
HOW LIKELY ARE YOU TO NOD OFF OR FALL ASLEEP WHILE SITTING QUIETLY AFTER LUNCH WITHOUT ALCOHOL: SLIGHT CHANCE OF DOZING
HOW LIKELY ARE YOU TO NOD OFF OR FALL ASLEEP WHILE SITTING AND READING: MODERATE CHANCE OF DOZING
HOW LIKELY ARE YOU TO NOD OFF OR FALL ASLEEP WHILE WATCHING TV: MODERATE CHANCE OF DOZING
HOW LIKELY ARE YOU TO NOD OFF OR FALL ASLEEP WHILE LYING DOWN TO REST IN THE AFTERNOON WHEN CIRCUMSTANCES PERMIT: MODERATE CHANCE OF DOZING
HOW LIKELY ARE YOU TO NOD OFF OR FALL ASLEEP WHEN YOU ARE A PASSENGER IN A CAR FOR AN HOUR WITHOUT A BREAK: MODERATE CHANCE OF DOZING
HOW LIKELY ARE YOU TO NOD OFF OR FALL ASLEEP WHILE SITTING AND TALKING TO SOMEONE: WOULD NEVER DOZE
HOW LIKELY ARE YOU TO NOD OFF OR FALL ASLEEP IN A CAR, WHILE STOPPED FOR A FEW MINUTES IN TRAFFIC: WOULD NEVER DOZE
HOW LIKELY ARE YOU TO NOD OFF OR FALL ASLEEP WHILE SITTING INACTIVE IN A PUBLIC PLACE: WOULD NEVER DOZE

## 2023-11-03 ASSESSMENT — PAIN SCALES - GENERAL: PAINLEVEL: NO PAIN (0)

## 2023-11-03 NOTE — PATIENT INSTRUCTIONS
Respironics Recall statement:?       You may also choose discuss with your provider alternative approaches to treatment.      *[facility] [employer] is voluntarily recalling the below devices due to two (2) issues related to the polyester-based polyurethane (PE-PUR) sound abatement foam used in [facility] Continuous and Non-Continuous Ventilators: 1) PE-PUR foam may degrade into particles which may enter the device's the air pathway and be ingested or inhaled by the user, and 2) the PE-PUR foam may off-gas certain chemicals. The foam degradation may be exacerbated by use of unapproved cleaning methods, such as ozone (see FDA safety communication on use of Ozone ), and off-gassing may occur during initial operation and may possibly continue throughout the device's useful life.   These issues can result in serious injury which can be life-threatening, cause permanent impairment, and/or require medical intervention to preclude permanent impairment. To date, [facility] [employer] has received several complaints regarding the presence of black debris/particles within the airpath circuit (extending from the device outlet, humidifier, tubing, and mask). [facility] also has received reports of headache, upper airway irritation, cough, chest pressure and sinus infection. The potential risks of particulate exposure include: Irritation (skin, eye, and respiratory tract), inflammatory response, headache, asthma, adverse effects to other organs (e.g. kidneys and liver) and toxic carcinogenic affects. The potential risks of chemical exposure due to off-gassing include: headache/dizziness, irritation (eyes, nose, respiratory tract, skin), hypersensitivity, nausea/vomiting, toxic and carcinogenic effects. There have been no reports of death as a result of these issues.    Actions to be taken:  Discontinue the use of your device.  Do not continue to use ozone  with the device.     [place] affected devices on the recall  website, www.Revert.IO.com/SRC-update    i. The website provides current information on the status of the recall and how  to receive permanent corrective action to address the two issues.    ii. The website also provides instructions on how to locate an affected device  Serial Number and will guide users through the registration process.    iii. In the [place], call [phone] Service Hotline if you cannot visit the website         General recommendations for sleep problems (Insomnia)  Allow 2-4 weeks to see results     Establish a regular sleep schedule    Most people only need 7-8 hours of sleep.  Don't be in bed longer than you need     to sleep or you will end up spending more time awake in bed. This trains your    brain to think of the bed as a place to not sleep.  Go to bed at same time each night   Get up at same time each day - Set an alarm everyday (even weekends). This is one of    the most important tips. It prevents you from relying on your insomnia to get you    up on time for your day. That actually reinforces insomnia. It also will help your    body get into a pattern where you start feeling tired at a consistent time each    night.  The body functions best when you keep a consistent routine.  Avoid sleeping-in and napping. Anytime you sleep during the day, you will be less tired at    night. You may be tired enough to fall asleep, but you will wake more in the    middle of the night because you will have met your sleep need before the night is    done.   Cut down time in bed (if not asleep, get up)- Use your bed only for sleep and sex    Anytime you spend time in bed doing activities other than sleep (reading,    watching TV, working, playing on the computer or phone, or even just laying in    bed trying to sleep), you are training  your brain to think of the bed as a place to    do activities other than sleep. If you are not falling asleep within 20-30 minutes,    get out of bed. While out of bed, avoid  bright lights. Avoid work or chores. Being    productive in the middle of the night reinforces waking up at night. Find relaxing,    not particularly entertaining activities like reading, listening to music, or relaxation    exercises. Go back to bed if you start feeling groggy, or after about 30 minutes,    even if not feeling very tired. Sometimes, just getting out of bed stops the pattern    of getting frustrated about laying in bed not sleeping, and that can help you fall    asleep.   Avoid trying to force yourself to sleep- sleep is not like everything else. The harder you    work at most things, the more you can accomplish. The harder you work at    sleep, the less you will sleep.     Make the bedroom comfortable - quiet, dark and cool are better. Consider ear plugs    (silicon). Use dark blinds or wear an eye mask if needed     Make a relaxing routine prior to bedtime  Relaxation exercises:   Progressive muscle relaxation: Relax each muscle group individually    Begin with your feet, flex, then relax. Try to imagine your feet feeling heavy and sinking into the bed. Move to your calves, do the same thing. Work through each muscle group toward your head.    Relaxing Mental Imagery: Try to imagine a trip that you took and found relaxing, or imagine a day at the beach. Try to walk yourself through the day in your mind as if you were dreaming it. Try to imagine sensing the different experiences, such as feeling sand between your toes, the heat of the sun on your skin, seeing the waves crashing the shore, the smell of the salt water, etc.     Deal with your worries before bedtime    Set aside a worry time around dinner time for 10-15 minutes. Write down the    things that are on your mind. Plan time in the coming days to address those    issues. Brainstorm ideas on how you will deal with them. Try to identify issues    that are out of your control, and try to let those issues go.  Listen to relaxation tapes    Classical Music or Nature sounds   Back Massage   Get regular exercise each day (at least 1-2 hours before bedtime)   Take medications only as directed   Eat a light bedtime snack or warm drink   Warm milk   Warm herbal tea (non-caffeinated)       Things to avoid   No overstimulating activities just before bed   No competitive games before bedtime   No exciting television programs before bedtime   Avoid caffeine after lunchtime   Avoid chocolate   Do not use alcohol to induce sleep (worsens Insomnia)   Do not take someone else's sleeping pills   Do not look at the clock when awakening   Do not turn on light when getting up to use bathroom, use a nightlight     Online Programs   www.[employer] (pronounced shut eye). There is a fee for this program. Enter the code  [place]  if you decide to enroll in this program.    www.sleepIO.com (pronounced sleep ee oh). There is a fee for this program. Enter the code  Shunk  if you decide to enroll in this program.     Suggested Resources  Insomnia Treatment Books   Overcoming Insomnia by Aayush Mock and Andie Baird (2008)  No More Sleepless Nights by Tad Graff and Jaycee Mckeon (1996)  Say Shahida to Insomnia by Brenton Qureshi (2009)  The Insomnia Workbook by Zoe Carrington and Rafita Hernandez (2009)  The Insomnia Answer by Gregorio Stiles and Jarrell Allen (2006)      Stress Management and Relaxation Books  The Relaxation and Stress Reduction Workbook by Bailey Davenpotr, Julianna Rooney and Mariano Charlton (2008)  Stress Management Workbook: Techniques and Self-Assessment Procedures by Janeen Nicole and Moy Arcos (1997)  A Mindfulness-Based Stress Reduction Workbook by Kelvin Portillo and Melodie Winchester (2010)  The Complete Stress Management Workbook by Kolby Lyon and Prosper Spears (1996)  Assert Yourself by Nina Devi and Joaquim Devi (1977)    Relaxation Resources for Computer Download   These websites offer  resources to help you relax. This list is for information only. [facility] is not responsible for the quality of services or the actions of any person or organization.  Progressive Muscle Relaxation (PMR):   http://www.Travefy/progressive-muscle-relaxation-exercise.html   http://studentsupport.[facility]/counseling/resources/self-help/relaxation-and-stress-management/   Deep Breathing Exercises:  http://www.Travefy/breathing-awareness.html     Meditation:   wwwConvercent  www.PICS AuditingguidedPrintLess Plansmeditation-site.Getonic You may have to pay for some of these resources.    Guided Imagery:  http://www.Travefy/guided-imagery-scripts.html   http://PrestoBox/library/qzrjscnybj-rgbkjg-vabeamw/   Consider phone apps such as: Calm, Headspace or Insight Timer.    Counseling / Behavioral Health  [facility]  Visit [email or web address] or call [phone] to find a clinic close to you.  Or call [phone] for [facility].

## 2023-11-03 NOTE — PROGRESS NOTES
Virtual Visit Details    Type of service:  Video Visit   Start Time: 8:31 AM   End Time: 9:00 AM    Originating Location (pt. Location): Home    Distant Location (provider location):  Off-site  Platform used for Video Visit: Media Matchmaker

## 2023-11-03 NOTE — NURSING NOTE
Is the patient currently in the state of [place]? YES    Visit mode:VIDEO    If the visit is dropped, the patient can be reconnected by: VIDEO VISIT: Text to cell phone:   Telephone Information:   Mobile [phone]       Will anyone else be joining the visit? NO  (If patient encounters technical issues they should call [phone])    How would you like to obtain your AVS? MyChart    Are changes needed to the allergy or medication list? No    Reason for visit: RECHJULIÁN TIMMONS

## 2023-11-28 ENCOUNTER — DOCUMENTATION ONLY (OUTPATIENT)
Dept: SLEEP MEDICINE | Facility: CLINIC | Age: 53
End: 2023-11-28
Payer: COMMERCIAL

## 2023-11-28 DIAGNOSIS — G47.33 OSA (OBSTRUCTIVE SLEEP APNEA): Primary | ICD-10-CM

## 2023-11-28 NOTE — PROGRESS NOTES
Patient was offered choice of vendor and chose [facility].  Patient Reg Kathleen was set up at [place] on November 28, 2023. Patient received a Resmed Airsense 11 Pressures were set at  7-15 cm H2O.   Patient s ramp is 4 cm H2O for Auto and FLEX/EPR is EPR, 2.  Patient received a Resmed Mask name: Airfit N30i  Nasal mask size Standard, small-wide, heated tubing and heated humidifier.  Patient has the following compliance requirements: none  Patient has a follow up recommended with Bennett Goltz, PA-C.    Tracy L Fahrenkamp

## 2024-07-06 ENCOUNTER — HEALTH MAINTENANCE LETTER (OUTPATIENT)
Age: 54
End: 2024-07-06

## 2024-12-27 ENCOUNTER — TELEPHONE (OUTPATIENT)
Dept: SLEEP MEDICINE | Facility: CLINIC | Age: 54
End: 2024-12-27
Payer: COMMERCIAL

## 2024-12-27 NOTE — TELEPHONE ENCOUNTER
General Call      Reason for Call:  patient called and has an appt in March with [facility] at  [facility]     Patient would like an order for cpap supplies prior to this appt.    [facility].    Please contact patient.  Thank you.    What are your questions or concerns:  no    Date of last appointment with provider: March 2025    Could we send this information to you in GetGifted or would you prefer to receive a phone call?:   Patient would prefer a phone call   Okay to leave a detailed message?: Yes at Home number on file [phone] (home)

## 2024-12-30 ENCOUNTER — DOCUMENTATION ONLY (OUTPATIENT)
Dept: SLEEP MEDICINE | Facility: CLINIC | Age: 54
End: 2024-12-30
Payer: COMMERCIAL

## 2024-12-30 DIAGNOSIS — G47.33 OBSTRUCTIVE SLEEP APNEA (ADULT) (PEDIATRIC): Primary | ICD-10-CM
